# Patient Record
Sex: FEMALE | Race: WHITE | NOT HISPANIC OR LATINO
[De-identification: names, ages, dates, MRNs, and addresses within clinical notes are randomized per-mention and may not be internally consistent; named-entity substitution may affect disease eponyms.]

---

## 2017-01-17 ENCOUNTER — FORM ENCOUNTER (OUTPATIENT)
Age: 52
End: 2017-01-17

## 2017-01-18 ENCOUNTER — APPOINTMENT (OUTPATIENT)
Dept: MAMMOGRAPHY | Facility: IMAGING CENTER | Age: 52
End: 2017-01-18

## 2017-01-18 ENCOUNTER — APPOINTMENT (OUTPATIENT)
Dept: ULTRASOUND IMAGING | Facility: IMAGING CENTER | Age: 52
End: 2017-01-18

## 2017-01-18 ENCOUNTER — OUTPATIENT (OUTPATIENT)
Dept: OUTPATIENT SERVICES | Facility: HOSPITAL | Age: 52
LOS: 1 days | End: 2017-01-18
Payer: COMMERCIAL

## 2017-01-18 DIAGNOSIS — R92.2 INCONCLUSIVE MAMMOGRAM: ICD-10-CM

## 2017-01-18 DIAGNOSIS — Z12.31 ENCOUNTER FOR SCREENING MAMMOGRAM FOR MALIGNANT NEOPLASM OF BREAST: ICD-10-CM

## 2017-01-18 PROCEDURE — G0279: CPT

## 2017-01-18 PROCEDURE — 77067 SCR MAMMO BI INCL CAD: CPT

## 2017-01-18 PROCEDURE — 76641 ULTRASOUND BREAST COMPLETE: CPT

## 2017-01-18 PROCEDURE — 77065 DX MAMMO INCL CAD UNI: CPT

## 2017-02-05 ENCOUNTER — RESULT REVIEW (OUTPATIENT)
Age: 52
End: 2017-02-05

## 2017-11-30 ENCOUNTER — APPOINTMENT (OUTPATIENT)
Dept: SURGICAL ONCOLOGY | Facility: CLINIC | Age: 52
End: 2017-11-30
Payer: COMMERCIAL

## 2017-11-30 VITALS
WEIGHT: 108 LBS | RESPIRATION RATE: 12 BRPM | HEART RATE: 75 BPM | DIASTOLIC BLOOD PRESSURE: 73 MMHG | BODY MASS INDEX: 18.44 KG/M2 | HEIGHT: 64 IN | SYSTOLIC BLOOD PRESSURE: 111 MMHG

## 2017-11-30 PROCEDURE — 99213 OFFICE O/P EST LOW 20 MIN: CPT

## 2018-03-06 ENCOUNTER — MESSAGE (OUTPATIENT)
Age: 53
End: 2018-03-06

## 2018-03-26 ENCOUNTER — APPOINTMENT (OUTPATIENT)
Dept: SURGICAL ONCOLOGY | Facility: CLINIC | Age: 53
End: 2018-03-26
Payer: COMMERCIAL

## 2018-03-26 VITALS
SYSTOLIC BLOOD PRESSURE: 111 MMHG | DIASTOLIC BLOOD PRESSURE: 76 MMHG | HEIGHT: 64 IN | OXYGEN SATURATION: 99 % | HEART RATE: 82 BPM | WEIGHT: 108 LBS | BODY MASS INDEX: 18.44 KG/M2

## 2018-03-26 PROCEDURE — 99214 OFFICE O/P EST MOD 30 MIN: CPT

## 2018-04-02 ENCOUNTER — APPOINTMENT (OUTPATIENT)
Dept: SURGICAL ONCOLOGY | Facility: CLINIC | Age: 53
End: 2018-04-02

## 2018-08-27 ENCOUNTER — TRANSCRIPTION ENCOUNTER (OUTPATIENT)
Age: 53
End: 2018-08-27

## 2018-12-03 ENCOUNTER — APPOINTMENT (OUTPATIENT)
Dept: SURGICAL ONCOLOGY | Facility: CLINIC | Age: 53
End: 2018-12-03
Payer: COMMERCIAL

## 2018-12-03 VITALS
HEIGHT: 64 IN | DIASTOLIC BLOOD PRESSURE: 71 MMHG | HEART RATE: 75 BPM | SYSTOLIC BLOOD PRESSURE: 115 MMHG | WEIGHT: 108 LBS | RESPIRATION RATE: 16 BRPM | OXYGEN SATURATION: 98 % | BODY MASS INDEX: 18.44 KG/M2

## 2018-12-03 PROCEDURE — 99213 OFFICE O/P EST LOW 20 MIN: CPT

## 2019-02-28 ENCOUNTER — MESSAGE (OUTPATIENT)
Age: 54
End: 2019-02-28

## 2019-03-18 ENCOUNTER — MESSAGE (OUTPATIENT)
Age: 54
End: 2019-03-18

## 2019-05-07 ENCOUNTER — APPOINTMENT (OUTPATIENT)
Dept: ULTRASOUND IMAGING | Facility: IMAGING CENTER | Age: 54
End: 2019-05-07

## 2019-05-07 ENCOUNTER — APPOINTMENT (OUTPATIENT)
Dept: MAMMOGRAPHY | Facility: IMAGING CENTER | Age: 54
End: 2019-05-07

## 2019-09-09 ENCOUNTER — MOBILE ON CALL (OUTPATIENT)
Age: 54
End: 2019-09-09

## 2019-12-09 ENCOUNTER — APPOINTMENT (OUTPATIENT)
Dept: SURGICAL ONCOLOGY | Facility: CLINIC | Age: 54
End: 2019-12-09
Payer: COMMERCIAL

## 2019-12-09 VITALS
BODY MASS INDEX: 18.44 KG/M2 | OXYGEN SATURATION: 98 % | RESPIRATION RATE: 16 BRPM | HEART RATE: 79 BPM | DIASTOLIC BLOOD PRESSURE: 75 MMHG | HEIGHT: 64 IN | WEIGHT: 108 LBS | SYSTOLIC BLOOD PRESSURE: 114 MMHG

## 2019-12-09 PROCEDURE — 99213 OFFICE O/P EST LOW 20 MIN: CPT

## 2019-12-26 ENCOUNTER — APPOINTMENT (OUTPATIENT)
Dept: OBGYN | Facility: CLINIC | Age: 54
End: 2019-12-26
Payer: COMMERCIAL

## 2019-12-26 PROCEDURE — 36415 COLL VENOUS BLD VENIPUNCTURE: CPT

## 2019-12-26 PROCEDURE — 99243 OFF/OP CNSLTJ NEW/EST LOW 30: CPT

## 2020-01-08 ENCOUNTER — APPOINTMENT (OUTPATIENT)
Dept: GYNECOLOGIC ONCOLOGY | Facility: CLINIC | Age: 55
End: 2020-01-08

## 2020-01-28 ENCOUNTER — APPOINTMENT (OUTPATIENT)
Dept: GYNECOLOGIC ONCOLOGY | Facility: CLINIC | Age: 55
End: 2020-01-28

## 2020-06-01 NOTE — ASSESSMENT
[FreeTextEntry1] : Clinically doing well.\par \par She has her breast imaging at [facility].\par September 2019 bilateral mammogram: BI-RADS-0.\par Bilateral breast ultrasounds recommended.\par September 2019: Bilateral breast ultrasound BI-RADS 3.\par 6 month followup bilateral ultrasounds recommended for March 2020, I had mailed her a prescription September 26, 2019.\par \par If asymptomatic, with normal imaging, we will see her in another year, sooner if needed\par \par \par 05-22-20:\par Bilateral breast sonogram in [facility]: BI-RADS 3.\par Prescription for annual breast imaging September 2020 with meals 06/01/20.

## 2020-06-01 NOTE — PHYSICAL EXAM
[Normal] : supple, no neck mass and thyroid not enlarged [Normal Neck Lymph Nodes] : normal neck lymph nodes  [Normal Supraclavicular Lymph Nodes] : normal supraclavicular lymph nodes [Normal Axillary Lymph Nodes] : normal axillary lymph nodes [Normal] : normal appearance, no rash, nodules, vesicles, ulcers, erythema [de-identified] : Groins not examined

## 2020-12-14 ENCOUNTER — APPOINTMENT (OUTPATIENT)
Dept: SURGICAL ONCOLOGY | Facility: CLINIC | Age: 55
End: 2020-12-14
Payer: COMMERCIAL

## 2020-12-14 VITALS
HEIGHT: 64 IN | WEIGHT: 109 LBS | HEART RATE: 73 BPM | SYSTOLIC BLOOD PRESSURE: 111 MMHG | DIASTOLIC BLOOD PRESSURE: 66 MMHG | RESPIRATION RATE: 16 BRPM | OXYGEN SATURATION: 98 % | BODY MASS INDEX: 18.61 KG/M2

## 2020-12-14 PROCEDURE — 99214 OFFICE O/P EST MOD 30 MIN: CPT

## 2020-12-14 PROCEDURE — 99072 ADDL SUPL MATRL&STAF TM PHE: CPT

## 2020-12-15 NOTE — HISTORY OF PRESENT ILLNESS
[de-identified] : 55 year-old lady who we have followed for periodic breast examinations since March 2000.\par \par +FH:\par A paternal cousin had breast cancer.\par No relatives with ovarian cancer\par \par Not Ashkenazi.\par \par She had a left breast aspiration at [facility] in March 2016.\par \par Menarche was at age 14\par A single pregnancy was delivered at age 32.\par \par \par Her breast cancer risk score is 26.7 (calculated March 26, 2018) \par She is RELUCTANT to have a baseline breast MRI\par \par \par Her gynecologist is Dr. Yared Bass, Feb 2016 was ok.\par After he retired, she saw Dr. Ashlee Royal\par She now sees Dr. Teofilo BURGESS.\par December 2020 visit was unremarkable, ovarian cyst continues to be followed.\par \par \par Her internist is Dr. Adelia BROWN\par \par She does not have a pacemaker or defibrillator.\par She takes no anticoagulants.\par \par Her only current medication is propranolol, for MVP.\par \par Cardiology: Dr. Joel GOLDBERG\par \par \par c-scope: March 2019: Dr. Luis M segovia x7 years

## 2020-12-15 NOTE — REASON FOR VISIT
[Follow-Up Visit] : a follow-up visit for [Other: _____] : [unfilled] [FreeTextEntry2] : Annual follow-up for increased risk of breast cancer

## 2020-12-15 NOTE — ASSESSMENT
[FreeTextEntry1] : Clinically doing well.\par \par October 2020:\par Bilateral mammogram and sonogram [facility]: BI-RADS 2.\par Prescription provided for October 2021.  \par \par Despite a breast cancer risk score of 26.7, she prefers NOT have surveillance breast MRIs.\par \par \par If asymptomatic, with normal imaging, we will see her in another year, sooner if needed

## 2020-12-15 NOTE — PHYSICAL EXAM
[Normal] : supple, no neck mass and thyroid not enlarged [Normal Neck Lymph Nodes] : normal neck lymph nodes  [Normal Supraclavicular Lymph Nodes] : normal supraclavicular lymph nodes [Normal Axillary Lymph Nodes] : normal axillary lymph nodes [Normal] : normal appearance, no rash, nodules, vesicles, ulcers, erythema [de-identified] : Groins not examined

## 2021-02-19 ENCOUNTER — APPOINTMENT (OUTPATIENT)
Dept: SURGICAL ONCOLOGY | Facility: CLINIC | Age: 56
End: 2021-02-19
Payer: COMMERCIAL

## 2021-02-19 VITALS
WEIGHT: 108 LBS | BODY MASS INDEX: 18.44 KG/M2 | HEIGHT: 64 IN | DIASTOLIC BLOOD PRESSURE: 78 MMHG | SYSTOLIC BLOOD PRESSURE: 118 MMHG | HEART RATE: 86 BPM | OXYGEN SATURATION: 98 %

## 2021-02-19 PROCEDURE — 99215 OFFICE O/P EST HI 40 MIN: CPT

## 2021-02-19 PROCEDURE — 99072 ADDL SUPL MATRL&STAF TM PHE: CPT

## 2021-02-19 NOTE — PHYSICAL EXAM
[Normal] : supple, no neck mass and thyroid not enlarged [Normal Neck Lymph Nodes] : normal neck lymph nodes  [Normal Supraclavicular Lymph Nodes] : normal supraclavicular lymph nodes [Normal Axillary Lymph Nodes] : normal axillary lymph nodes [Normal] : normal appearance, no rash, nodules, vesicles, ulcers, erythema [de-identified] : Groins not examined

## 2021-02-19 NOTE — ASSESSMENT
[FreeTextEntry1] : October 2020:\par Bilateral mammogram and sonogram at [facility]: BI-RADS 2.\par \par Clinically, the tenderness appears to be musculoskeletal in nature, rather than association with the breast parenchyma.\par \par I suggested the following imaging and provided prescriptions for:\par Targeted left breast ultrasound (LIQ).\par Chest x-ray.\par Left rib x-rays.\par \par We will speak after the results.\par \par Even if her symptoms do not progress, and her imaging is normal, I have asked to see her in 6 to 8 weeks, sooner if needed.

## 2021-02-19 NOTE — HISTORY OF PRESENT ILLNESS
[de-identified] : 55 year-old lady who we have followed for periodic breast examinations since March 2000.\par \par Her most recent visit was December 2020.\par She was asymptomatic, with a normal examination.\par Her breast imaging was also current and up-to-date.\par \par She presents today with a 3 to 4-week history of tenderness in the lower inner quadrant of her left breast.\par She still has her menstrual periods, and there may be a temporal relation to her cycle.\par She also swims a fair amount, and the onset may be related to this activity also.\par \par No other signs or symptoms related to either breast.\par \par Caffeine intake: 2 beverages daily.\par Non-smoker.\par No exogenous hormones.\par \par She had a left breast aspiration at [facility] in March 2016.\par No other procedures related to either breast.\par \par +FH:\par A paternal cousin had breast cancer.\par No relatives with ovarian cancer\par \par Not Ashkenazi.\par \par Other relatives with a history of malignancy:\par Father had nonmelanoma skin cancer.\par \par \par Menarche was at age 14\par A single pregnancy was delivered at age 32.\par \par \par Her breast cancer risk score is 26.7 (calculated March 26, 2018) \par She is RELUCTANT to have a baseline breast MRI\par \par \par Her gynecologist is Dr. Yared Bass, Feb 2016 was ok.\par After he retired, she saw Dr. Ashlee Royal\par She now sees Dr. Teofilo BURGESS.\par December 2020 visit was unremarkable, ovarian cyst continues to be followed.\par \par \par Her internist is Dr. Adelia BROWN\par \par She does not have a pacemaker or defibrillator.\par She takes no anticoagulants.\par \par Her only current medication is propranolol, for MVP.\par \par Cardiology: Dr. Joel GOLDBERG\par \par \par c-scope: March 2019: Dr. Luis M segovia x7 years

## 2021-02-19 NOTE — REVIEW OF SYSTEMS
[Negative] : Integumentary [FreeTextEntry5] : Mitral valve prolapse [de-identified] : Ovarian cysts [FreeTextEntry1] : Increased risk of breast cancer

## 2021-02-19 NOTE — REASON FOR VISIT
[Other: _____] : [unfilled] [FreeTextEntry2] : Evaluation and management of new pain in the lower inner quadrant of her left breast

## 2021-05-17 ENCOUNTER — APPOINTMENT (OUTPATIENT)
Dept: SURGICAL ONCOLOGY | Facility: CLINIC | Age: 56
End: 2021-05-17
Payer: COMMERCIAL

## 2021-05-17 NOTE — HISTORY OF PRESENT ILLNESS
[de-identified] : 55-year-old lady returning for evaluation of LEFT BEAST pain (LIQ).\par \par Presented 2/19/2021, with a 3-4-week history of tenderness in her left breast (LIQ).\par Her annual breast exam disease November 2020) had been normal.\par Onset may have been related to a recent increase in swimming.\par Did not have a cyclical component.\par No other signs or symptoms related to either breast.\par \par My PE:\par Left chest tenderness was associated with the lower rib cage, rather than the breast parenchyma.\par No other findings noted.\par \par Breast imaging 4 months prior (October 2020) had been normal (BI-RADS 2 closed disease.\par \par Subsequent imaging:\par 4/23/2021:\par Left chest/breast ultrasound at [facility]: BI-RADS 2.\par No abnormalities identified.\par Accompanying chest x-ray was normal.\par \par She returns for scheduled follow-up today.\par \par \par We have followed for periodic breast examinations since March 2000.\par \par \par Potential factors associated with mastalgia:\par Caffeine intake: 2 beverages daily.\par Non-smoker.\par No exogenous hormones.\par \par \par + Prior personal history:\par She had a left breast aspiration at [facility] in March 2016.\par No other procedures related to either breast.\par \par \par +FH:\par A paternal cousin had breast cancer.\par \par No relatives with ovarian cancer\par \par Not Ashkenazi.\par \par \par Other relatives with a history of malignancy:\par Father had nonmelanoma skin cancer.\par \par \par Menarche was at age 14\par A single pregnancy was delivered at age 32.\par \par \par Her breast cancer risk score is 26.7 (calculated March 26, 2018) \par She is RELUCTANT to have a baseline breast MRI\par \par \par Her gynecologist is Dr. Yared Bass, Feb 2016 was ok.\par After he retired, she saw Dr. Ashlee Royal\par She now sees Dr. Teofilo BURGESS.\par December 2020 visit was unremarkable, ovarian cyst continues to be followed.\par \par \par Her internist is Dr. Adelia BROWN\par \par She does not have a pacemaker or defibrillator.\par She takes no anticoagulants.\par \par Her only current medication is propranolol, for MVP.\par \par Cardiology: Dr. Joel GOLDBERG\par \par \par c-scope: March 2019: Dr. Luis M segovia x7 years

## 2021-05-17 NOTE — PHYSICAL EXAM
[Normal] : supple, no neck mass and thyroid not enlarged [Normal Neck Lymph Nodes] : normal neck lymph nodes  [Normal Supraclavicular Lymph Nodes] : normal supraclavicular lymph nodes [Normal Axillary Lymph Nodes] : normal axillary lymph nodes [Normal] : normal appearance, no rash, nodules, vesicles, ulcers, erythema [de-identified] : Groins not examined

## 2021-05-17 NOTE — ASSESSMENT
[FreeTextEntry1] : 5/17/2021: She did not keep her appointment for follow-up of left mastalgia\par \par Yearly mammogram and sonogram Due October 2021 at [facility] ([facility]).\par \par \par Despite her elevated breast cancer risk score, she is reluctant to have baseline breast MRI.\par \par

## 2021-05-24 ENCOUNTER — APPOINTMENT (OUTPATIENT)
Dept: SURGICAL ONCOLOGY | Facility: CLINIC | Age: 56
End: 2021-05-24
Payer: COMMERCIAL

## 2021-05-24 VITALS
HEIGHT: 64 IN | RESPIRATION RATE: 16 BRPM | HEART RATE: 73 BPM | WEIGHT: 108 LBS | OXYGEN SATURATION: 99 % | BODY MASS INDEX: 18.44 KG/M2 | SYSTOLIC BLOOD PRESSURE: 114 MMHG | DIASTOLIC BLOOD PRESSURE: 76 MMHG

## 2021-05-24 DIAGNOSIS — N60.99 UNSPECIFIED BENIGN MAMMARY DYSPLASIA OF UNSPECIFIED BREAST: ICD-10-CM

## 2021-05-24 PROCEDURE — 99072 ADDL SUPL MATRL&STAF TM PHE: CPT

## 2021-05-24 PROCEDURE — 99214 OFFICE O/P EST MOD 30 MIN: CPT

## 2021-07-07 ENCOUNTER — APPOINTMENT (OUTPATIENT)
Dept: ORTHOPEDIC SURGERY | Facility: CLINIC | Age: 56
End: 2021-07-07
Payer: COMMERCIAL

## 2021-07-07 VITALS
WEIGHT: 108 LBS | DIASTOLIC BLOOD PRESSURE: 79 MMHG | BODY MASS INDEX: 18.44 KG/M2 | HEIGHT: 64 IN | SYSTOLIC BLOOD PRESSURE: 113 MMHG | HEART RATE: 84 BPM

## 2021-07-07 DIAGNOSIS — G56.80 OTHER SPECIFIED MONONEUROPATHIES OF UNSPECIFIED UPPER LIMB: ICD-10-CM

## 2021-07-07 DIAGNOSIS — M25.512 PAIN IN LEFT SHOULDER: ICD-10-CM

## 2021-07-07 PROCEDURE — 99072 ADDL SUPL MATRL&STAF TM PHE: CPT

## 2021-07-07 PROCEDURE — 99202 OFFICE O/P NEW SF 15 MIN: CPT

## 2021-08-18 ENCOUNTER — TRANSCRIPTION ENCOUNTER (OUTPATIENT)
Age: 56
End: 2021-08-18

## 2021-10-04 NOTE — PHYSICAL EXAM
[Normal] : supple, no neck mass and thyroid not enlarged [Normal Neck Lymph Nodes] : normal neck lymph nodes  [Normal Supraclavicular Lymph Nodes] : normal supraclavicular lymph nodes [Normal Axillary Lymph Nodes] : normal axillary lymph nodes [Normal] : normal appearance, no rash, nodules, vesicles, ulcers, erythema [de-identified] : Groins not examined

## 2021-10-04 NOTE — REASON FOR VISIT
[Follow-Up Visit] : a follow-up visit for [Other: _____] : [unfilled] [FreeTextEntry2] : Left breast pain and tenderness, along with increased risk of breast cancer

## 2021-10-04 NOTE — HISTORY OF PRESENT ILLNESS
[de-identified] : 55 year-old lady returning for evaluation of LEFT BEAST pain (LIQ).\par \par Presented 2/19/2021, with a 3-4-week history of tenderness in her left breast (LIQ).\par Her annual breast exam disease November 2020) had been normal.\par Onset may have been related to a recent increase in swimming.\par Did not have a cyclical component.\par No other signs or symptoms related to either breast.\par \par My PE:\par Left chest tenderness was associated with the lower rib cage, rather than the breast parenchyma.\par No other findings noted.\par \par Breast imaging 4 months prior (October 2020) had been normal (BI-RADS 2 closed disease.\par \par Subsequent imaging:\par 4/23/2021:\par Left chest/breast ultrasound at [facility]: BI-RADS 2.\par No abnormalities identified.\par Accompanying chest x-ray was normal.\par \par She returns for scheduled follow-up today.\par Today she is asymptomatic.\par \par \par We have followed for periodic breast examinations since March 2000.\par \par \par Potential factors associated with mastalgia:\par Caffeine intake: 2 beverages daily.\par Non-smoker.\par No exogenous hormones.\par \par \par + Prior personal history:\par She had a left breast aspiration at [facility] in March 2016.\par No other procedures related to either breast.\par \par \par +FH:\par A paternal cousin had breast cancer.\par \par No relatives with ovarian cancer\par \par Not Ashkenazi.\par \par \par Other relatives with a history of malignancy:\par Father had nonmelanoma skin cancer.\par \par \par Menarche was at age 14\par A single pregnancy was delivered at age 32.\par \par \par Her breast cancer risk score is 26.7 (calculated March 26, 2018) \par She is RELUCTANT to have a baseline breast MRI\par \par \par Her internist is Dr. Adelia BROWN\par \par She does not have a pacemaker or defibrillator.\par She takes no anticoagulants.\par \par Her only current medication is propranolol, for MVP.\par \par Cardiology: Dr. Joel GOLDBERG\par \par \par Her gynecologist is Dr. Yared Bass, Feb 2016 was ok.\par After he retired, she saw Dr. Ashlee Royal\par She now sees Dr. Teofilo BURGESS.\par December 2020 visit was unremarkable, ovarian cyst continues to be followed.\par \par \par c-scope: March 2019: Dr. Luis M Ramirez okay x7 years

## 2021-10-04 NOTE — ASSESSMENT
[FreeTextEntry1] : Clinically doing well.\par Her left breast pain and tenderness have resolved.\par No new signs or symptoms related to either breast.\par \par Yearly mammogram and sonogram Due October 2021 at [facility] ([facility]par \par \par Despite her elevated breast cancer risk score (26.7), she is reluctant to have baseline breast MRI.\par \par If asymptomatic, with normal imaging, we should see her in another year, sooner if needed\par \par \par \par 9/24/2021:\par I returned her call but had to leave a voicemail.\par \par \par 10/4/2021:\par I returned her call.\par I had to leave another voicemail.\par \par

## 2021-12-17 ENCOUNTER — APPOINTMENT (OUTPATIENT)
Dept: SURGICAL ONCOLOGY | Facility: CLINIC | Age: 56
End: 2021-12-17

## 2022-05-01 RX ORDER — ALBUTEROL SULFATE 90 UG/1
108 (90 BASE) INHALANT RESPIRATORY (INHALATION)
Qty: 8 | Refills: 0 | Status: ACTIVE | COMMUNITY
Start: 2022-01-17

## 2022-05-01 RX ORDER — ZOLPIDEM TARTRATE 5 MG/1
5 TABLET ORAL
Qty: 10 | Refills: 0 | Status: ACTIVE | COMMUNITY
Start: 2022-01-27

## 2022-05-01 RX ORDER — FLUTICASONE PROPIONATE 110 UG/1
110 AEROSOL, METERED RESPIRATORY (INHALATION)
Qty: 12 | Refills: 0 | Status: ACTIVE | COMMUNITY
Start: 2022-01-22

## 2022-05-02 ENCOUNTER — APPOINTMENT (OUTPATIENT)
Dept: SURGICAL ONCOLOGY | Facility: CLINIC | Age: 57
End: 2022-05-02
Payer: COMMERCIAL

## 2022-05-02 VITALS
RESPIRATION RATE: 15 BRPM | SYSTOLIC BLOOD PRESSURE: 113 MMHG | WEIGHT: 108 LBS | BODY MASS INDEX: 18.44 KG/M2 | HEART RATE: 75 BPM | OXYGEN SATURATION: 99 % | TEMPERATURE: 97.8 F | HEIGHT: 64 IN | DIASTOLIC BLOOD PRESSURE: 76 MMHG

## 2022-05-02 PROCEDURE — 99214 OFFICE O/P EST MOD 30 MIN: CPT

## 2022-05-02 NOTE — ASSESSMENT
[FreeTextEntry1] : Clinically doing well.\par Her left breast pain and tenderness have resolved.\par No new signs or symptoms related to either breast.\par \par November 2021:\par Bilateral mammogram and sonogram at [facility] in [place]: BI-RADS 2.\par Prescription provided for November 2022.\par \par \par Despite her elevated breast cancer risk score (26.7), she is reluctant to have baseline breast MRI.\par \par If asymptomatic, with normal imaging, we should see her in another year, sooner if needed\par \par

## 2022-05-02 NOTE — PHYSICAL EXAM
[Normal] : supple, no neck mass and thyroid not enlarged [Normal Neck Lymph Nodes] : normal neck lymph nodes  [Normal Supraclavicular Lymph Nodes] : normal supraclavicular lymph nodes [Normal Axillary Lymph Nodes] : normal axillary lymph nodes [Normal] : normal appearance, no rash, nodules, vesicles, ulcers, erythema [de-identified] : Groins not examined

## 2022-05-02 NOTE — REASON FOR VISIT
[Follow-Up Visit] : a follow-up visit for [Other: _____] : [unfilled] [FreeTextEntry2] : Annual breast exam, breast cancer risk score = 26.7 (patient declines breast MRI)

## 2022-05-02 NOTE — HISTORY OF PRESENT ILLNESS
[de-identified] : 56-year-old lady.\par \par \par We have followed for periodic breast examinations since March 2000.\par \par \par CC:\par No signs or symptoms related to either breast today.\par \par \par \par February 2021:\par 3-4-week history of left mastalgia.\par My exam was unremarkable.\par Imaging was normal.\par Symptoms were self-limited and resolved.\par \par \par Potential factors associated with mastalgia:\par Caffeine intake: 2 beverages daily.\par Non-smoker.\par No exogenous hormones.\par \par \par + Prior personal history:\par She had a left breast aspiration at [facility] in March 2016.\par No other procedures related to either breast.\par \par \par +FH:\par A paternal cousin had breast cancer.\par \par No relatives with ovarian cancer\par \par Not Ashkenazi.\par \par \par Other relatives with a history of malignancy:\par Father had nonmelanoma skin cancer.\par \par \par Menarche was at age 14\par A single pregnancy was delivered at age 32.\par \par \par Her breast cancer risk score is 26.7 (calculated March 26, 2018) \par She is RELUCTANT to have a baseline breast MRI\par \par \par Her internist is Dr. Adelia BROWN, BUT she retired in April 2022.\par Our patient saw her in February 2022.\par For next year, she will be changing to one of the associates, but not sure of which 1.\par \par She does not have a pacemaker or defibrillator.\par She takes no anticoagulants.\par \par Her only current medication is propranolol, for MVP.\par \par Cardiology: Dr. Joel GOLDBERG\par \par \par Her gynecologist is Dr. Yared Bass, Feb 2016 was ok.\par After he retired, she saw Dr. Ashlee Royal\par She now sees Dr. Teofilo BURGESS.\par December 2020 visit was unremarkable, ovarian cyst continues to be followed.\par \par \par c-scope: March 2019: Dr. Luis M Ramirez okay x7 years

## 2022-05-02 NOTE — REVIEW OF SYSTEMS
[Negative] : Integumentary [FreeTextEntry5] : KELSIE [de-identified] : Prior history of mastalgia [FreeTextEntry1] : Increased risk of breast cancer

## 2022-08-22 NOTE — HISTORY OF PRESENT ILLNESS
[de-identified] : 54 year-old lady who we have followed for periodic breast examinations since March 2000.\par \par +FH:\par A paternal cousin had breast cancer.\par No relatives with ovarian cancer\par \par Not Ashkenazi.\par \par She had a left breast aspiration at [facility] in March 2016.\par \par Menarche was at age 14\par A single pregnancy was delivered at age 32.\par \par \par Her breast cancer risk score is 26.7 (calculated March 26, 2018) \par She is RELUCTANT to have a baseline breast MRI\par \par Her gynecologist is Dr. Yared Bass, Feb 2016 was ok.\par She saw Dr. Ashlee LUND in August 2019.\par An abnormal Pap smear prompted a pelvic ultrasound which identified an incidental left ovarian cyst.\par Also, although her cervix appeared normal, there was a concern regarding the endometrial lining.\par She has a surgical opinion scheduled with Dr. Teofilo Ventura\par \par Her internist is Dr. Adelia BROWN\par \par She does not have a pacemaker or defibrillator.\par She takes no anticoagulants.\par \par Her only current medication is propranolol, for MVP\par \par \par c-scope, not yet Peng Advancement Flap Text: The defect edges were debeveled with a #15 scalpel blade.  Given the location of the defect, shape of the defect and the proximity to free margins a Peng advancement flap was deemed most appropriate.  Using a sterile surgical marker, an appropriate advancement flap was drawn incorporating the defect and placing the expected incisions within the relaxed skin tension lines where possible. The area thus outlined was incised deep to adipose tissue with a #15 scalpel blade.  The skin margins were undermined to an appropriate distance in all directions utilizing iris scissors.

## 2022-11-02 ENCOUNTER — OFFICE (OUTPATIENT)
Dept: URBAN - METROPOLITAN AREA CLINIC 29 | Facility: CLINIC | Age: 57
Setting detail: OPHTHALMOLOGY
End: 2022-11-02
Payer: COMMERCIAL

## 2022-11-02 DIAGNOSIS — H01.001: ICD-10-CM

## 2022-11-02 DIAGNOSIS — H00.14: ICD-10-CM

## 2022-11-02 DIAGNOSIS — H01.004: ICD-10-CM

## 2022-11-02 PROCEDURE — 11900 INJECT SKIN LESIONS </W 7: CPT | Performed by: OPHTHALMOLOGY

## 2022-11-02 PROCEDURE — 99213 OFFICE O/P EST LOW 20 MIN: CPT | Performed by: OPHTHALMOLOGY

## 2022-11-02 ASSESSMENT — REFRACTION_AUTOREFRACTION
OD_SPHERE: -1.50
OD_CYLINDER: +0.25
OD_AXIS: 025
OS_CYLINDER: +0.25
OS_SPHERE: -3.25
OS_AXIS: 130

## 2022-11-02 ASSESSMENT — REFRACTION_MANIFEST
OS_SPHERE: -3.00
OD_ADD: +2.00
OS_CYLINDER: SPH
OD_SPHERE: -2.00
OD_CYLINDER: SPH
OD_VA1: 20/20-1
OS_VA1: 20/20
OS_ADD: +2.00

## 2022-11-02 ASSESSMENT — REFRACTION_CURRENTRX
OS_CYLINDER: SPH
OS_SPHERE: -2.75
OD_SPHERE: -2.00
OD_CYLINDER: SPH
OD_VPRISM_DIRECTION: SV
OS_CYLINDER: SPH
OS_CYLINDER: SPH
OD_CYLINDER: SPH
OD_SPHERE: +1.00
OD_OVR_VA: 20/
OS_AXIS: 090
OD_OVR_VA: 20/
OD_SPHERE: -2.00
OD_OVR_VA: 20/
OS_OVR_VA: 20/
OS_SPHERE: -3.00
OS_OVR_VA: 20/
OS_VPRISM_DIRECTION: SV
OS_SPHERE: +1.00
OD_CYLINDER: SPH
OS_OVR_VA: 20/
OD_AXIS: 090

## 2022-11-02 ASSESSMENT — SPHEQUIV_DERIVED
OD_SPHEQUIV: -1.375
OS_SPHEQUIV: -3.125

## 2022-11-02 ASSESSMENT — LID EXAM ASSESSMENTS
OS_EDEMA: LUL 1+
OD_BLEPHARITIS: RUL 1+
OS_BLEPHARITIS: LUL 1+

## 2022-11-02 ASSESSMENT — CONFRONTATIONAL VISUAL FIELD TEST (CVF)
OD_FINDINGS: FULL
OS_FINDINGS: FULL

## 2022-11-02 ASSESSMENT — VISUAL ACUITY
OD_BCVA: 20/40
OS_BCVA: 20/20

## 2022-11-02 ASSESSMENT — SUPERFICIAL PUNCTATE KERATITIS (SPK)
OD_SPK: T
OS_SPK: T

## 2023-01-31 ENCOUNTER — OFFICE (OUTPATIENT)
Dept: URBAN - METROPOLITAN AREA CLINIC 29 | Facility: CLINIC | Age: 58
Setting detail: OPHTHALMOLOGY
End: 2023-01-31
Payer: COMMERCIAL

## 2023-01-31 DIAGNOSIS — H00.14: ICD-10-CM

## 2023-01-31 DIAGNOSIS — H01.001: ICD-10-CM

## 2023-01-31 DIAGNOSIS — H01.004: ICD-10-CM

## 2023-01-31 PROCEDURE — 11900 INJECT SKIN LESIONS </W 7: CPT | Performed by: OPHTHALMOLOGY

## 2023-01-31 PROCEDURE — 99213 OFFICE O/P EST LOW 20 MIN: CPT | Performed by: OPHTHALMOLOGY

## 2023-01-31 ASSESSMENT — REFRACTION_CURRENTRX
OS_CYLINDER: SPH
OS_OVR_VA: 20/
OD_VPRISM_DIRECTION: SV
OD_OVR_VA: 20/
OD_SPHERE: +1.00
OD_CYLINDER: SPH
OS_OVR_VA: 20/
OS_OVR_VA: 20/
OD_OVR_VA: 20/
OS_CYLINDER: SPH
OD_SPHERE: -2.00
OD_SPHERE: -2.00
OS_VPRISM_DIRECTION: SV
OD_CYLINDER: SPH
OD_OVR_VA: 20/
OS_SPHERE: -2.75
OS_SPHERE: +1.00
OS_CYLINDER: SPH
OD_AXIS: 090
OS_AXIS: 090
OS_SPHERE: -3.00
OD_CYLINDER: SPH

## 2023-01-31 ASSESSMENT — LID EXAM ASSESSMENTS
OS_EDEMA: LUL 1+
OD_BLEPHARITIS: RUL 1+
OS_BLEPHARITIS: LUL 1+

## 2023-01-31 ASSESSMENT — REFRACTION_MANIFEST
OS_ADD: +2.00
OD_SPHERE: -2.00
OS_VA1: 20/20
OS_SPHERE: -3.00
OD_VA1: 20/20-1
OD_ADD: +2.00
OS_CYLINDER: SPH
OD_CYLINDER: SPH

## 2023-01-31 ASSESSMENT — REFRACTION_AUTOREFRACTION
OS_SPHERE: -3.25
OD_SPHERE: -1.50
OS_AXIS: 130
OD_AXIS: 025
OD_CYLINDER: +0.25
OS_CYLINDER: +0.25

## 2023-01-31 ASSESSMENT — CONFRONTATIONAL VISUAL FIELD TEST (CVF)
OD_FINDINGS: FULL
OS_FINDINGS: FULL

## 2023-01-31 ASSESSMENT — SPHEQUIV_DERIVED
OD_SPHEQUIV: -1.375
OS_SPHEQUIV: -3.125

## 2023-01-31 ASSESSMENT — SUPERFICIAL PUNCTATE KERATITIS (SPK)
OS_SPK: T
OD_SPK: T

## 2023-02-13 ENCOUNTER — OFFICE (OUTPATIENT)
Dept: URBAN - METROPOLITAN AREA CLINIC 29 | Facility: CLINIC | Age: 58
Setting detail: OPHTHALMOLOGY
End: 2023-02-13
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
## 2023-02-13 PROCEDURE — 10004 FNA BX W/O IMG GDN EA ADDL: CPT | Performed by: OPHTHALMOLOGY

## 2023-03-09 ENCOUNTER — OFFICE (OUTPATIENT)
Dept: URBAN - METROPOLITAN AREA CLINIC 29 | Facility: CLINIC | Age: 58
Setting detail: OPHTHALMOLOGY
End: 2023-03-09
Payer: COMMERCIAL

## 2023-03-09 DIAGNOSIS — H01.004: ICD-10-CM

## 2023-03-09 DIAGNOSIS — H01.001: ICD-10-CM

## 2023-03-09 DIAGNOSIS — H00.14: ICD-10-CM

## 2023-03-09 PROCEDURE — 99213 OFFICE O/P EST LOW 20 MIN: CPT | Performed by: OPHTHALMOLOGY

## 2023-03-09 ASSESSMENT — REFRACTION_CURRENTRX
OS_OVR_VA: 20/
OD_SPHERE: +1.00
OS_CYLINDER: SPH
OD_SPHERE: -2.00
OD_CYLINDER: SPH
OS_OVR_VA: 20/
OS_AXIS: 090
OS_SPHERE: +1.00
OS_CYLINDER: SPH
OS_SPHERE: -2.75
OD_SPHERE: -2.00
OD_OVR_VA: 20/
OS_OVR_VA: 20/
OD_CYLINDER: SPH
OD_CYLINDER: SPH
OS_VPRISM_DIRECTION: SV
OD_OVR_VA: 20/
OS_CYLINDER: SPH
OD_AXIS: 090
OD_VPRISM_DIRECTION: SV
OD_OVR_VA: 20/
OS_SPHERE: -3.00

## 2023-03-09 ASSESSMENT — REFRACTION_AUTOREFRACTION
OS_AXIS: 130
OD_AXIS: 025
OS_SPHERE: -3.25
OD_SPHERE: -1.50
OD_CYLINDER: +0.25
OS_CYLINDER: +0.25

## 2023-03-09 ASSESSMENT — VISUAL ACUITY
OD_BCVA: 20/DEFFER
OS_BCVA: 20/DEFFER

## 2023-03-09 ASSESSMENT — REFRACTION_MANIFEST
OD_CYLINDER: SPH
OS_VA1: 20/20
OD_ADD: +2.00
OD_VA1: 20/20-1
OS_CYLINDER: SPH
OD_SPHERE: -2.00
OS_ADD: +2.00
OS_SPHERE: -3.00

## 2023-03-09 ASSESSMENT — LID EXAM ASSESSMENTS
OS_EDEMA: LUL 1+
OS_BLEPHARITIS: LUL 1+
OD_BLEPHARITIS: RUL 1+

## 2023-03-09 ASSESSMENT — SUPERFICIAL PUNCTATE KERATITIS (SPK)
OD_SPK: T
OS_SPK: T

## 2023-03-09 ASSESSMENT — CONFRONTATIONAL VISUAL FIELD TEST (CVF)
OD_FINDINGS: FULL
OS_FINDINGS: FULL

## 2023-03-09 ASSESSMENT — SPHEQUIV_DERIVED
OS_SPHEQUIV: -3.125
OD_SPHEQUIV: -1.375

## 2023-03-15 ENCOUNTER — RX ONLY (RX ONLY)
Age: 58
End: 2023-03-15

## 2023-03-15 ENCOUNTER — OFFICE (OUTPATIENT)
Dept: URBAN - METROPOLITAN AREA CLINIC 29 | Facility: CLINIC | Age: 58
Setting detail: OPHTHALMOLOGY
End: 2023-03-15
Payer: COMMERCIAL

## 2023-03-15 DIAGNOSIS — H00.15: ICD-10-CM

## 2023-03-15 PROCEDURE — 67800 REMOVE EYELID LESION: CPT | Performed by: OPHTHALMOLOGY

## 2023-03-15 ASSESSMENT — REFRACTION_CURRENTRX
OS_SPHERE: -2.75
OS_SPHERE: -3.00
OD_CYLINDER: SPH
OS_AXIS: 090
OD_CYLINDER: SPH
OD_SPHERE: +1.00
OS_CYLINDER: SPH
OD_SPHERE: -2.00
OD_VPRISM_DIRECTION: SV
OS_CYLINDER: SPH
OS_OVR_VA: 20/
OD_CYLINDER: SPH
OS_VPRISM_DIRECTION: SV
OS_OVR_VA: 20/
OD_OVR_VA: 20/
OD_SPHERE: -2.00
OS_OVR_VA: 20/
OS_SPHERE: +1.00
OD_AXIS: 090
OD_OVR_VA: 20/
OD_OVR_VA: 20/
OS_CYLINDER: SPH

## 2023-03-15 ASSESSMENT — REFRACTION_MANIFEST
OD_SPHERE: -2.00
OD_VA1: 20/20-1
OS_VA1: 20/20
OD_CYLINDER: SPH
OS_ADD: +2.00
OD_ADD: +2.00
OS_SPHERE: -3.00
OS_CYLINDER: SPH

## 2023-03-15 ASSESSMENT — LID EXAM ASSESSMENTS
OD_BLEPHARITIS: RUL 1+
OS_BLEPHARITIS: LUL 1+
OS_EDEMA: LUL 1+

## 2023-03-15 ASSESSMENT — SUPERFICIAL PUNCTATE KERATITIS (SPK)
OS_SPK: T
OD_SPK: T

## 2023-03-15 ASSESSMENT — VISUAL ACUITY
OD_BCVA: 20/DEFFER
OS_BCVA: 20/DEFFER

## 2023-03-15 ASSESSMENT — REFRACTION_AUTOREFRACTION
OD_AXIS: 025
OS_SPHERE: -3.25
OD_SPHERE: -1.50
OS_AXIS: 130
OS_CYLINDER: +0.25
OD_CYLINDER: +0.25

## 2023-03-15 ASSESSMENT — SPHEQUIV_DERIVED
OD_SPHEQUIV: -1.375
OS_SPHEQUIV: -3.125

## 2023-05-05 ENCOUNTER — APPOINTMENT (OUTPATIENT)
Dept: SURGICAL ONCOLOGY | Facility: CLINIC | Age: 58
End: 2023-05-05
Payer: COMMERCIAL

## 2023-05-05 VITALS
DIASTOLIC BLOOD PRESSURE: 76 MMHG | BODY MASS INDEX: 18.44 KG/M2 | SYSTOLIC BLOOD PRESSURE: 114 MMHG | RESPIRATION RATE: 16 BRPM | WEIGHT: 108 LBS | HEIGHT: 64 IN | HEART RATE: 83 BPM | OXYGEN SATURATION: 98 %

## 2023-05-05 PROCEDURE — 99213 OFFICE O/P EST LOW 20 MIN: CPT

## 2023-05-08 NOTE — REASON FOR VISIT
[Follow-Up Visit] : a follow-up visit for [Other: _____] : [unfilled] [FreeTextEntry2] : Annual breast examination, breast cancer risk score = 26.7 (she declines breast MRI)

## 2023-05-08 NOTE — HISTORY OF PRESENT ILLNESS
[de-identified] : 57 year-old lady.\par \par \par We have followed for periodic breast examinations since March 2000.\par \par \par CC:\par No signs or symptoms related to either breast today.\par \par \par \par February 2021:\par 3-4-week history of left mastalgia.\par My exam was unremarkable.\par Imaging was normal.\par Symptoms were self-limited and resolved.\par \par \par Potential factors associated with mastalgia:\par Caffeine intake: 2 beverages daily.\par Non-smoker.\par No exogenous hormones.\par \par \par + Prior personal history:\par She had a left breast aspiration at [facility] in March 2016.\par No other procedures related to either breast.\par \par \par +FH:\par A paternal cousin had breast cancer.\par \par No relatives with ovarian cancer\par \par Not Ashkenazi.\par \par \par Other relatives with a history of malignancy:\par Father had nonmelanoma skin cancer.\par \par \par Menarche was at age 14\par A single pregnancy was delivered at age 32.\par \par \par Her breast cancer risk score is 26.7 (calculated March 26, 2018) \par She is RELUCTANT to have a baseline breast MRI\par \par \par PMD: Dr. Jacqueline Kim.\par She used to see Dr. Linda Putnam, who retired in the spring 2022.\par \par She does not have a pacemaker or defibrillator.\par She takes no anticoagulants.\par \par Her only current medication is propranolol, for MVP.\par \par Cardiology: Dr. Joel GOLDBERG\par \par \par GYN: Dr. Teofilo BURGESS.\par September 2022 visit was unremarkable.\par \par Previously she saw Dr. Yared Bass, then Dr. Ashlee Royal\par \par \par c-scope: March 2019: Dr. Luis M segovia x7 years

## 2023-05-08 NOTE — REVIEW OF SYSTEMS
[Negative] : Endocrine [FreeTextEntry5] : Mitral valve prolapse [FreeTextEntry1] : Increased risk of breast cancer

## 2023-05-08 NOTE — ASSESSMENT
[FreeTextEntry1] : Clinically doing well.\par Her left breast pain and tenderness have resolved.\par No new signs or symptoms related to either breast.\par \par November 2021:\par Bilateral mammogram and sonogram at [facility] in [place]: BI-RADS 2.\par Prescription provided for November 2022 at her last office visit\par \par (12/13/2022:\par Bilateral mammogram and sonogram at [facility]: BI-RADS 2)...\par \par \par Despite her elevated breast cancer risk score (26.7), she is reluctant to have baseline breast MRI.\par \par If asymptomatic, with normal imaging, we should see her in another year, sooner if needed\par \par

## 2023-05-08 NOTE — PHYSICAL EXAM
[Normal] : supple, no neck mass and thyroid not enlarged [Normal Neck Lymph Nodes] : normal neck lymph nodes  [Normal Supraclavicular Lymph Nodes] : normal supraclavicular lymph nodes [Normal Axillary Lymph Nodes] : normal axillary lymph nodes [Normal] : normal appearance, no rash, nodules, vesicles, ulcers, erythema [de-identified] : Groins not examined

## 2023-06-07 ENCOUNTER — OFFICE (OUTPATIENT)
Dept: URBAN - METROPOLITAN AREA CLINIC 29 | Facility: CLINIC | Age: 58
Setting detail: OPHTHALMOLOGY
End: 2023-06-07
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
## 2023-06-07 PROCEDURE — 10004 FNA BX W/O IMG GDN EA ADDL: CPT | Performed by: OPHTHALMOLOGY

## 2023-06-28 ENCOUNTER — NON-APPOINTMENT (OUTPATIENT)
Age: 58
End: 2023-06-28

## 2023-07-11 ENCOUNTER — NON-APPOINTMENT (OUTPATIENT)
Age: 58
End: 2023-07-11

## 2023-08-09 ENCOUNTER — OFFICE (OUTPATIENT)
Dept: URBAN - METROPOLITAN AREA CLINIC 29 | Facility: CLINIC | Age: 58
Setting detail: OPHTHALMOLOGY
End: 2023-08-09
Payer: COMMERCIAL

## 2023-08-09 DIAGNOSIS — H01.001: ICD-10-CM

## 2023-08-09 DIAGNOSIS — H16.223: ICD-10-CM

## 2023-08-09 DIAGNOSIS — H01.004: ICD-10-CM

## 2023-08-09 DIAGNOSIS — H00.15: ICD-10-CM

## 2023-08-09 PROCEDURE — 92012 INTRM OPH EXAM EST PATIENT: CPT | Performed by: OPHTHALMOLOGY

## 2023-08-09 ASSESSMENT — SPHEQUIV_DERIVED
OD_SPHEQUIV: -1.375
OS_SPHEQUIV: -3.125

## 2023-08-09 ASSESSMENT — REFRACTION_MANIFEST
OD_ADD: +2.00
OS_ADD: +2.00
OD_SPHERE: -2.00
OD_VA1: 20/20-1
OS_CYLINDER: SPH
OS_SPHERE: -3.00
OS_VA1: 20/20
OD_CYLINDER: SPH

## 2023-08-09 ASSESSMENT — REFRACTION_CURRENTRX
OS_SPHERE: +1.00
OD_SPHERE: -2.00
OS_AXIS: 090
OS_OVR_VA: 20/
OD_VPRISM_DIRECTION: SV
OS_OVR_VA: 20/
OS_AXIS: 090
OS_VPRISM_DIRECTION: SV
OD_AXIS: 090
OD_SPHERE: +1.00
OD_CYLINDER: SPH
OS_CYLINDER: SPH
OS_CYLINDER: SPH
OS_OVR_VA: 20/
OS_SPHERE: -3.00
OD_AXIS: 090
OD_CYLINDER: SPH
OD_CYLINDER: SPH
OD_OVR_VA: 20/
OS_SPHERE: -2.75
OS_CYLINDER: SPH
OD_OVR_VA: 20/
OD_SPHERE: -2.00
OD_OVR_VA: 20/

## 2023-08-09 ASSESSMENT — VISUAL ACUITY
OS_BCVA: 20/20-2
OD_BCVA: 20/20

## 2023-08-09 ASSESSMENT — REFRACTION_AUTOREFRACTION
OD_CYLINDER: +0.25
OS_AXIS: 130
OS_SPHERE: -3.25
OS_CYLINDER: +0.25
OD_AXIS: 025
OD_SPHERE: -1.50

## 2023-08-09 ASSESSMENT — SUPERFICIAL PUNCTATE KERATITIS (SPK)
OS_SPK: T
OD_SPK: T

## 2023-08-09 ASSESSMENT — LID EXAM ASSESSMENTS
OD_BLEPHARITIS: RUL 1+
OS_EDEMA: LUL 1+
OS_BLEPHARITIS: LUL 1+

## 2023-08-09 ASSESSMENT — CONFRONTATIONAL VISUAL FIELD TEST (CVF)
OD_FINDINGS: FULL
OS_FINDINGS: FULL

## 2023-09-05 ENCOUNTER — OFFICE (OUTPATIENT)
Dept: URBAN - METROPOLITAN AREA CLINIC 29 | Facility: CLINIC | Age: 58
Setting detail: OPHTHALMOLOGY
End: 2023-09-05
Payer: COMMERCIAL

## 2023-09-05 DIAGNOSIS — H01.001: ICD-10-CM

## 2023-09-05 DIAGNOSIS — H00.15: ICD-10-CM

## 2023-09-05 DIAGNOSIS — H01.004: ICD-10-CM

## 2023-09-05 PROCEDURE — 92014 COMPRE OPH EXAM EST PT 1/>: CPT | Performed by: OPHTHALMOLOGY

## 2023-09-05 ASSESSMENT — VISUAL ACUITY
OS_BCVA: 20/20
OD_BCVA: 20/20-2

## 2023-09-05 ASSESSMENT — REFRACTION_MANIFEST
OD_CYLINDER: SPH
OS_CYLINDER: SPH
OD_VA1: 20/20-1
OD_SPHERE: -2.00
OD_ADD: +2.00
OS_VA1: 20/20
OS_SPHERE: -3.00
OS_ADD: +2.00

## 2023-09-05 ASSESSMENT — REFRACTION_CURRENTRX
OS_OVR_VA: 20/
OS_AXIS: 090
OS_AXIS: 090
OS_CYLINDER: SPH
OD_OVR_VA: 20/
OS_SPHERE: +1.00
OS_SPHERE: -2.75
OD_AXIS: 090
OD_SPHERE: -2.00
OD_OVR_VA: 20/
OS_SPHERE: -3.00
OS_OVR_VA: 20/
OS_CYLINDER: SPH
OD_SPHERE: -2.00
OD_OVR_VA: 20/
OD_AXIS: 090
OS_OVR_VA: 20/
OD_SPHERE: +1.00
OD_CYLINDER: SPH
OS_CYLINDER: SPH
OD_CYLINDER: SPH
OD_CYLINDER: SPH
OD_VPRISM_DIRECTION: SV
OS_VPRISM_DIRECTION: SV

## 2023-09-05 ASSESSMENT — TONOMETRY
OD_IOP_MMHG: 13
OS_IOP_MMHG: 13

## 2023-09-05 ASSESSMENT — LID EXAM ASSESSMENTS
OS_BLEPHARITIS: LUL 1+
OD_BLEPHARITIS: RUL 1+
OS_EDEMA: LUL 1+

## 2023-09-05 ASSESSMENT — REFRACTION_AUTOREFRACTION
OD_CYLINDER: +0.25
OS_CYLINDER: +0.25
OD_SPHERE: -1.75
OS_SPHERE: -2.75
OD_AXIS: 026
OS_AXIS: 135

## 2023-09-05 ASSESSMENT — CONFRONTATIONAL VISUAL FIELD TEST (CVF)
OD_FINDINGS: FULL
OS_FINDINGS: FULL

## 2023-09-05 ASSESSMENT — SPHEQUIV_DERIVED
OS_SPHEQUIV: -2.625
OD_SPHEQUIV: -1.625

## 2023-09-05 ASSESSMENT — SUPERFICIAL PUNCTATE KERATITIS (SPK)
OS_SPK: T
OD_SPK: T

## 2023-09-18 ENCOUNTER — OFFICE (OUTPATIENT)
Dept: URBAN - METROPOLITAN AREA CLINIC 29 | Facility: CLINIC | Age: 58
Setting detail: OPHTHALMOLOGY
End: 2023-09-18
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
## 2023-09-18 PROCEDURE — 10004 FNA BX W/O IMG GDN EA ADDL: CPT | Performed by: OPHTHALMOLOGY

## 2023-10-25 ENCOUNTER — APPOINTMENT (OUTPATIENT)
Dept: CARDIOLOGY | Facility: CLINIC | Age: 58
End: 2023-10-25
Payer: COMMERCIAL

## 2023-10-25 VITALS
DIASTOLIC BLOOD PRESSURE: 64 MMHG | HEIGHT: 64 IN | HEART RATE: 68 BPM | WEIGHT: 108 LBS | SYSTOLIC BLOOD PRESSURE: 108 MMHG | BODY MASS INDEX: 18.44 KG/M2 | OXYGEN SATURATION: 99 %

## 2023-10-25 PROCEDURE — 99204 OFFICE O/P NEW MOD 45 MIN: CPT | Mod: 25

## 2023-10-25 PROCEDURE — 93000 ELECTROCARDIOGRAM COMPLETE: CPT

## 2023-11-07 ENCOUNTER — APPOINTMENT (OUTPATIENT)
Dept: CT IMAGING | Facility: CLINIC | Age: 58
End: 2023-11-07
Payer: SELF-PAY

## 2023-11-07 ENCOUNTER — OUTPATIENT (OUTPATIENT)
Dept: OUTPATIENT SERVICES | Facility: HOSPITAL | Age: 58
LOS: 1 days | End: 2023-11-07
Payer: SELF-PAY

## 2023-11-07 DIAGNOSIS — E78.49 OTHER HYPERLIPIDEMIA: ICD-10-CM

## 2023-11-07 PROCEDURE — 75571 CT HRT W/O DYE W/CA TEST: CPT

## 2023-11-07 PROCEDURE — 75571 CT HRT W/O DYE W/CA TEST: CPT | Mod: 26

## 2023-11-10 ENCOUNTER — NON-APPOINTMENT (OUTPATIENT)
Age: 58
End: 2023-11-10

## 2023-12-18 ENCOUNTER — OFFICE (OUTPATIENT)
Dept: URBAN - METROPOLITAN AREA CLINIC 29 | Facility: CLINIC | Age: 58
Setting detail: OPHTHALMOLOGY
End: 2023-12-18
Payer: COMMERCIAL
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
## 2023-12-18 PROCEDURE — 10004 FNA BX W/O IMG GDN EA ADDL: CPT | Performed by: OPHTHALMOLOGY

## 2024-03-12 ENCOUNTER — APPOINTMENT (OUTPATIENT)
Dept: ORTHOPEDIC SURGERY | Facility: CLINIC | Age: 59
End: 2024-03-12

## 2024-03-19 ENCOUNTER — OFFICE (OUTPATIENT)
Dept: URBAN - METROPOLITAN AREA CLINIC 29 | Facility: CLINIC | Age: 59
Setting detail: OPHTHALMOLOGY
End: 2024-03-19
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
## 2024-03-19 PROCEDURE — 10004 FNA BX W/O IMG GDN EA ADDL: CPT | Performed by: OPHTHALMOLOGY

## 2024-03-22 ENCOUNTER — APPOINTMENT (OUTPATIENT)
Dept: ORTHOPEDIC SURGERY | Facility: CLINIC | Age: 59
End: 2024-03-22
Payer: COMMERCIAL

## 2024-03-22 VITALS
HEART RATE: 71 BPM | HEIGHT: 64 IN | SYSTOLIC BLOOD PRESSURE: 110 MMHG | BODY MASS INDEX: 18.44 KG/M2 | DIASTOLIC BLOOD PRESSURE: 80 MMHG | WEIGHT: 108 LBS

## 2024-03-22 DIAGNOSIS — M25.562 PAIN IN RIGHT KNEE: ICD-10-CM

## 2024-03-22 DIAGNOSIS — M25.561 PAIN IN RIGHT KNEE: ICD-10-CM

## 2024-03-22 PROCEDURE — 73562 X-RAY EXAM OF KNEE 3: CPT | Mod: 50

## 2024-03-22 PROCEDURE — 99203 OFFICE O/P NEW LOW 30 MIN: CPT

## 2024-04-05 ENCOUNTER — APPOINTMENT (OUTPATIENT)
Dept: CARDIOLOGY | Facility: CLINIC | Age: 59
End: 2024-04-05
Payer: COMMERCIAL

## 2024-04-05 VITALS
BODY MASS INDEX: 18.71 KG/M2 | SYSTOLIC BLOOD PRESSURE: 100 MMHG | HEART RATE: 77 BPM | WEIGHT: 109 LBS | OXYGEN SATURATION: 98 % | DIASTOLIC BLOOD PRESSURE: 76 MMHG

## 2024-04-05 DIAGNOSIS — R07.89 OTHER CHEST PAIN: ICD-10-CM

## 2024-04-05 DIAGNOSIS — E78.49 OTHER HYPERLIPIDEMIA: ICD-10-CM

## 2024-04-05 DIAGNOSIS — I34.1 NONRHEUMATIC MITRAL (VALVE) PROLAPSE: ICD-10-CM

## 2024-04-05 PROCEDURE — 93000 ELECTROCARDIOGRAM COMPLETE: CPT

## 2024-04-05 PROCEDURE — 99214 OFFICE O/P EST MOD 30 MIN: CPT

## 2024-04-05 RX ORDER — SULFAMETHOXAZOLE AND TRIMETHOPRIM 800; 160 MG/1; MG/1
800-160 TABLET ORAL
Qty: 6 | Refills: 0 | Status: DISCONTINUED | COMMUNITY
Start: 2022-02-11 | End: 2024-04-05

## 2024-04-05 RX ORDER — ROSUVASTATIN CALCIUM 5 MG/1
5 TABLET, FILM COATED ORAL DAILY
Qty: 60 | Refills: 1 | Status: ACTIVE | COMMUNITY
Start: 2024-04-05 | End: 1900-01-01

## 2024-04-05 NOTE — HISTORY OF PRESENT ILLNESS
[facility] [place] Neurosurgery Daily Progress Note    Patient: Hemalatha Barber Date: 2021   : 1951 Attending: Martine Wilcox CHLORIDE 110* 115* 115*   CO2 21 19* 19*   GLUCOSE 136* 104* 80   BUN 25* 28* 27*   CREATININE 0.94 0.80 0.71   CALCIUM 8.3* 8.6 9.3   TOTPROTEIN  --   --  6.5   ALBUMIN  --   --  1.7*   BILIRUBIN  --   --  0.3   AST  --   --  15   GPT  --   --  10   ALKPT  --   --  172*       Coags  Recent Labs   Lab 11/20/21  0957   INR 1.1         Microbiology:  Microbiology Results  (Last 10 results in the past 7 days)    Specimen   Gram Smear   Culture Result   Status       11/20/21  1400         No organisms seen.  [P]            Few Polymorphonuclear cells.  [P]            No epithelial cells seen.  [P]            No organisms seen.  [P]            Few Polymorphonuclear cells.  [P]            No epithelial cells seen.  [P]           11/20/21  1332         No organisms seen.  [P]            Few Polymorphonuclear cells.  [P]            No epithelial cells seen.  [P]            No organisms seen.  [P]            Few Polymorphonuclear cells.  [P]            No epithelial cells seen.  [P]                 [P] - Preliminary Result                Imaging:   XR CHEST AP OR PA 1 VIEW    Result Date: 11/20/2021  EXAM: XR CHEST PA OR AP 1 VIEW CLINICAL INDICATION: PICC placement COMPARISON: 11/05/2021 FINDINGS: Cardiomediastinal and hilar contours are stable.  Minimal bibasilar atelectasis.  Interval placement of a left arm PICC with the tip at the distal SVC.  No evidence of pulmonary consolidation or pneumothorax.  Visualized osseous structures and upper abdomen are within normal limits.     Interval left upper extremity PICC placement as described above.  Minimal bibasilar atelectasis. Electronically Signed by: REHANA EATON MD Signed on: 11/20/2021 12:41 PM   '      IMPRESSION/PLAN:  68 y/o F w/ PMHx of Lupus, HTN, HLD, hypothyroid, cervical myelopathy s/p PCDF with Dr. Ross 5/4/21 complicated by epidural hematoma s/p evacuation with Dr. Morley 5/8/21, s/p T11-12 laminectomy, L3-5 TLIF on 9/9/21 by Dr. Ross for thoracic  [FreeTextEntry1] : Patient is a 58-year-old female well-known to my practice for the last 18 years who presents the office today concerned regarding her lipids as in the interval she has had a calcium score noting 70 A units in the LAD distribution.  She has had negative stress test and is more concerned over her family history of heart disease and concern over active CAD.  She has known mitral prolapse, and history of atypical chest pain and at the time of last visit was suggested she proceed with CT angiogram because of her lipids and a family history of sister having an MI which proved to be coronary spasm and no evidence of atherosclerosis.  Patient noted at the time of her last visit that she was in a high altitude environment on vacation, slept above 8000 feet and felt dyspnea and mild tachycardia.  She had the symptoms for approximately 3 days and increased her propranolol dose from 5 mg daily to 5 mg twice daily.  After a few days she felt relief but has remained on 5 twice daily of propranolol.  She is quite active she swims on a regular basis walks and has had no symptomatology suspicious for ongoing ischemia.  Patient is particularly concerned today as her 59-year-old older sister recently had a cardiac event in [place].  Reportedly her coronaries were clean and she had a vasospastic type phenomenon and was discharged on calcium channel blockers.  She is an anesthesiologist under stress; reportedly had an acute MI but cardiac cath revealed clean coronary arteries.  She was placed on calcium channel blocker, statin therapy and aspirin and was told that there was no occlusive disease.  Patient has had a longstanding history of high cholesterol but high HDL risk ratio is at 3.4 but total  with low triglycerides.  She visited Dr. Eleni Merino who suggested a CT angiogram and she was nervous about the radiation and wondered if there was any alternatives.  At that point review recommended a calcium score of her coronaries and based on that she has agreed to initiate statin therapy.  She presents today with no symptoms whatsoever.  She has resumed all of her usual activities.  She is careful on her diet, exercises regularly and has had negative stress test in the past but echocardiography performed this past April has changes consistent with mild mitral prolapse Brendon Ross.

## 2024-04-05 NOTE — REASON FOR VISIT
[Arrhythmia/ECG Abnorrmalities] : arrhythmia/ECG abnormalities [Structural Heart and Valve Disease] : structural heart and valve disease [Spouse] : spouse [FreeTextEntry1] : Patient is a 58-year-old white female with known mitral prolapse, chronic palpitations who presents the office today for routine interval follow-up accompanied by her .  Patient was last seen here in the office in October and she had   concerns over feeling poorly while at high altitude on a recent vacation.  Those symptoms were attributed to lack of adequate nation and today's issues revolve around her cholesterol and mildly elevated calcium score.  Patient presents otherwise with no new or active cardiac concerns or complaints.

## 2024-04-05 NOTE — PHYSICAL EXAM
[Well Developed] : well developed [Well Nourished] : well nourished [No Acute Distress] : no acute distress [Normal Venous Pressure] : normal venous pressure [No Carotid Bruit] : no carotid bruit [Normal S1, S2] : normal S1, S2 [No Rub] : no rub [Murmur] : murmur [Clear Lung Fields] : clear lung fields [Good Air Entry] : good air entry [No Respiratory Distress] : no respiratory distress  [Soft] : abdomen soft [Non Tender] : non-tender [Normal] : no edema, no cyanosis, no clubbing, no varicosities [de-identified] : Midsystolic click and grade 2/6 apical MR murmur

## 2024-04-05 NOTE — DISCUSSION/SUMMARY
[FreeTextEntry1] : Patient is a very pleasant 58 year-old female with a strong family history of CAD, hyperlipidemia with calcium score of 70 in her LAD, known mitral valve prolapse, and recent symptoms at high altitude of dyspnea and tachycardia.  She self administered additional low-dose beta-blocker and after few days of acclamation her symptoms resolved.  She sought an opinion locally and was advised based on current lipid values to have a CT angiogram.  Patient's lipid profile notable for a total cholesterol of 242 ;HDL risk of 3.4 and HDL of 72.  Her 10-year calculated risk of a cardiac event is 1.2% based on her findings on calcium score it was recommended she implement Crestor 5 mg alternate days with co-Q10 200 mg/day.  Updated laboratory data including an hs-CRP will be obtained and patient is comfortable with this approach.  She is aware that if there is any statin intolerance alternatives will be pursued.   Patient patient presents today looking and feeling well without any active symptomatology and is fully active.  She was reassured.  Updated stress test will be considered if symptomatology recurs otherwise advised to follow-up in approximately 6 months.  She is advised to stay on slightly increased dose of low-dose propranolol and will repeat blood test in approximately 6 to 8 weeks after she initiates statin therapy.   Joel Goldberg, MD, FACC [EKG obtained to assist in diagnosis and management of assessed problem(s)] : EKG obtained to assist in diagnosis and management of assessed problem(s)

## 2024-04-05 NOTE — REVIEW OF SYSTEMS
[SOB] : no shortness of breath [Dyspnea on exertion] : not dyspnea during exertion [Chest Discomfort] : no chest discomfort [Lower Ext Edema] : no extremity edema [Leg Claudication] : no intermittent leg claudication [Palpitations] : no palpitations [Orthopnea] : no orthopnea [PND] : no PND [Syncope] : no syncope [Dysuria] : no dysuria [Pelvic Pain] : no pelvic pain [Abn Vaginal Bleeding] : no unexplained vaginal bleeding [Negative] : Neurological

## 2024-04-06 ENCOUNTER — TRANSCRIPTION ENCOUNTER (OUTPATIENT)
Age: 59
End: 2024-04-06

## 2024-04-24 LAB
ALBUMIN SERPL ELPH-MCNC: 4.4 G/DL
ALP BLD-CCNC: 47 U/L
ALT SERPL-CCNC: 11 U/L
AST SERPL-CCNC: 13 U/L
BILIRUB DIRECT SERPL-MCNC: 0.1 MG/DL
BILIRUB INDIRECT SERPL-MCNC: 0.3 MG/DL
BILIRUB SERPL-MCNC: 0.4 MG/DL
CHOLEST SERPL-MCNC: 232 MG/DL
CRP SERPL HS-MCNC: 0.58 MG/L
HDLC SERPL-MCNC: 79 MG/DL
LDLC SERPL CALC-MCNC: 139 MG/DL
NONHDLC SERPL-MCNC: 153 MG/DL
PROT SERPL-MCNC: 6.8 G/DL
TRIGL SERPL-MCNC: 80 MG/DL

## 2024-05-06 ENCOUNTER — APPOINTMENT (OUTPATIENT)
Dept: SURGICAL ONCOLOGY | Facility: CLINIC | Age: 59
End: 2024-05-06
Payer: COMMERCIAL

## 2024-05-06 VITALS
HEIGHT: 64 IN | SYSTOLIC BLOOD PRESSURE: 103 MMHG | RESPIRATION RATE: 16 BRPM | HEART RATE: 74 BPM | OXYGEN SATURATION: 98 % | BODY MASS INDEX: 18.44 KG/M2 | WEIGHT: 108 LBS | DIASTOLIC BLOOD PRESSURE: 68 MMHG

## 2024-05-06 DIAGNOSIS — Z91.89 OTHER SPECIFIED PERSONAL RISK FACTORS, NOT ELSEWHERE CLASSIFIED: ICD-10-CM

## 2024-05-06 PROCEDURE — 99215 OFFICE O/P EST HI 40 MIN: CPT

## 2024-05-15 ENCOUNTER — APPOINTMENT (OUTPATIENT)
Dept: PULMONOLOGY | Facility: CLINIC | Age: 59
End: 2024-05-15
Payer: COMMERCIAL

## 2024-05-15 VITALS
OXYGEN SATURATION: 98 % | HEIGHT: 64 IN | SYSTOLIC BLOOD PRESSURE: 100 MMHG | RESPIRATION RATE: 16 BRPM | TEMPERATURE: 97.4 F | HEART RATE: 60 BPM | DIASTOLIC BLOOD PRESSURE: 60 MMHG | WEIGHT: 108 LBS | BODY MASS INDEX: 18.44 KG/M2

## 2024-05-15 DIAGNOSIS — Z83.49 FAMILY HISTORY OF OTHER ENDOCRINE, NUTRITIONAL AND METABOLIC DISEASES: ICD-10-CM

## 2024-05-15 DIAGNOSIS — Q87.89 OTHER SPECIFIED CONGENITAL MALFORMATION SYNDROMES, NOT ELSEWHERE CLASSIFIED: ICD-10-CM

## 2024-05-15 DIAGNOSIS — Q82.8 OTHER SPECIFIED CONGENITAL MALFORMATION SYNDROMES, NOT ELSEWHERE CLASSIFIED: ICD-10-CM

## 2024-05-15 DIAGNOSIS — Z83.438 FAMILY HISTORY OF OTHER DISORDER OF LIPOPROTEIN METABOLISM AND OTHER LIPIDEMIA: ICD-10-CM

## 2024-05-15 DIAGNOSIS — Q79.9 OTHER SPECIFIED CONGENITAL MALFORMATION SYNDROMES, NOT ELSEWHERE CLASSIFIED: ICD-10-CM

## 2024-05-15 DIAGNOSIS — J30.2 OTHER SEASONAL ALLERGIC RHINITIS: ICD-10-CM

## 2024-05-15 DIAGNOSIS — Q25.40 OTHER SPECIFIED CONGENITAL MALFORMATION SYNDROMES, NOT ELSEWHERE CLASSIFIED: ICD-10-CM

## 2024-05-15 DIAGNOSIS — Z86.79 PERSONAL HISTORY OF OTHER DISEASES OF THE CIRCULATORY SYSTEM: ICD-10-CM

## 2024-05-15 DIAGNOSIS — Z72.820 SLEEP DEPRIVATION: ICD-10-CM

## 2024-05-15 DIAGNOSIS — Z82.49 FAMILY HISTORY OF ISCHEMIC HEART DISEASE AND OTHER DISEASES OF THE CIRCULATORY SYSTEM: ICD-10-CM

## 2024-05-15 DIAGNOSIS — Z86.16 PERSONAL HISTORY OF COVID-19: ICD-10-CM

## 2024-05-15 DIAGNOSIS — N60.99 UNSPECIFIED BENIGN MAMMARY DYSPLASIA OF UNSPECIFIED BREAST: ICD-10-CM

## 2024-05-15 DIAGNOSIS — T70.20XA UNSPECIFIED EFFECTS OF HIGH ALTITUDE, INITIAL ENCOUNTER: ICD-10-CM

## 2024-05-15 DIAGNOSIS — R91.8 OTHER NONSPECIFIC ABNORMAL FINDING OF LUNG FIELD: ICD-10-CM

## 2024-05-15 DIAGNOSIS — Z86.39 PERSONAL HISTORY OF OTHER ENDOCRINE, NUTRITIONAL AND METABOLIC DISEASE: ICD-10-CM

## 2024-05-15 DIAGNOSIS — Z78.0 ASYMPTOMATIC MENOPAUSAL STATE: ICD-10-CM

## 2024-05-15 DIAGNOSIS — U07.1 COVID-19: ICD-10-CM

## 2024-05-15 DIAGNOSIS — G43.909 MIGRAINE, UNSPECIFIED, NOT INTRACTABLE, W/OUT STATUS MIGRAINOSUS: ICD-10-CM

## 2024-05-15 DIAGNOSIS — Z82.5 FAMILY HISTORY OF ASTHMA AND OTHER CHRONIC LOWER RESPIRATORY DISEASES: ICD-10-CM

## 2024-05-15 DIAGNOSIS — R06.02 SHORTNESS OF BREATH: ICD-10-CM

## 2024-05-15 DIAGNOSIS — I34.1 OTHER SPECIFIED CONGENITAL MALFORMATION SYNDROMES, NOT ELSEWHERE CLASSIFIED: ICD-10-CM

## 2024-05-15 PROCEDURE — 94060 EVALUATION OF WHEEZING: CPT

## 2024-05-15 PROCEDURE — 94727 GAS DIL/WSHOT DETER LNG VOL: CPT

## 2024-05-15 PROCEDURE — 99204 OFFICE O/P NEW MOD 45 MIN: CPT | Mod: 25

## 2024-05-15 PROCEDURE — 94618 PULMONARY STRESS TESTING: CPT

## 2024-05-15 PROCEDURE — 94729 DIFFUSING CAPACITY: CPT

## 2024-05-15 PROCEDURE — 71046 X-RAY EXAM CHEST 2 VIEWS: CPT

## 2024-05-15 PROCEDURE — 95012 NITRIC OXIDE EXP GAS DETER: CPT

## 2024-05-15 PROCEDURE — ZZZZZ: CPT

## 2024-05-15 NOTE — REASON FOR VISIT
[Initial] : an initial visit [TextBox_44] : abnormal CT of the chest (prominent right pulmonary nodule), seasonal allergies, altitude sensativity, ?SO.

## 2024-05-15 NOTE — ASSESSMENT
[FreeTextEntry1] : Ms. SHANKS is a 58 year old female with a history of COVID 19 (2021 and 2024), MVP, benign breast disease, migraines, HLD, abnormal CT of the chest, post menopause, altitude sensitivity presenting to the office today for an initial pulmonary evaluation for abnormal CT of the chest (prominent right pulmonary nodule), seasonal allergies, altitude sensativity, ?SO.   problem 1: abnormal CT of the chest (dominate 7mm nodule) DDx: 1. hamartoma 2. carcinoid 3. cancer   -complete dedicated CT of the chest (no contrast, low dose) -complete QuantiFERON gold test, ACE level, hypersensitivity panel  -CAT scans are the only radiological modality to identify abnormalities within the lungs with regards to nodules/masses/lymph nodes. Risks, benefits were reviewed in detail. The guidelines for abnormalities include follow up CT scans at various intervals which could range from 6 weeks to 1 year intervals. If there is a change for the worse then consideration for a biopsy will be considered if the patient is a candidate. Second opinion evaluation with a thoracic surgeon or an interventional radiologist could be offered.   problem 2: Allergies/Sinus -add Olopatadine 0.6% 1 sniff BID -add Xlear saline nasal spray -Environmental measures for allergies were encouraged including mattress and pillow covers, air purifier, and environmental controls   Problem 3: ?SO (elevated Mallampati class, poor quality sleep, snoring) -complete home sleep study -Sleep apnea is associated with adverse clinical consequences which can affect most organ systems. Cardiovascular disease risk includes arrhythmias, atrial fibrillation, hypertension, coronary artery disease, and stroke. Metabolic disorders include diabetes type 2, non-alcoholic fatty liver disease. Mood disorder especially depression; and cognitive decline especially in the elderly. Associations with chronic reflux/Barretts esophagus some but not all inclusive. -Reasons include arousal consistent with hypopnea; respiratory events most prominent in REM sleep or supine position; therefore sleep staging and body position are important for accurate diagnosis and estimation of AHI.   problem 4: cardiac disease -recommended to continue to follow up with Cardiologist   problem 5: poor breathing mechanics -Proper breathing techniques were reviewed with an emphasis of exhalation. Patient instructed to breathe in for 1 second and out for 4 seconds. Patient was encouraged to not talk while walking.  problem 5 A: anxiety  -recommended to read "The Gift of Maybe" by Zaina Lund -Recommended Wim Hof and Buteyko breathing technique  problem 6: altitude sickness -recommended adequate hydration -Recommended Wim Hof and Buteyko breathing technique   problem 7: out of shape -Weight loss, exercise, and diet control were discussed and are highly encouraged. Treatment options are given such as, aqua therapy, and contacting a nutritionist. Recommended to use the elliptical, stationary bike, less use of treadmill.   problem 8: health maintenance -recommended yearly flu shot after October 15, 2023 -recommended strep pneumonia vaccines: Prevnar-20 vaccine, followed by Pneumo vaccine 23 one year following after 65 years old. -recommended early intervention for Upper Respiratory Infections (URIs) -recommended regular osteoporosis evaluations -recommended early dermatological evaluations -recommended after the age of 50 to the age of 70, colonoscopy every 5 years   F/P in 6-8 weeks. She is encouraged to call with any changes, concerns, or questions

## 2024-05-15 NOTE — ADDENDUM
[FreeTextEntry1] : Documented by Tavo Ruby acting as a scribe for Dr. Christopher Arriaga on 05/15/2024. All medical record entries made by the Scribe were at my, Dr. Christopher Arriaga's, direction and personally dictated by me on 05/15/2024. I have reviewed the chart and agree that the record accurately reflects my personal performance of the history, physical exam, assessment and plan. I have also personally directed, reviewed, and agree with the discharge instructions.

## 2024-05-15 NOTE — PHYSICAL EXAM
[No Acute Distress] : no acute distress [Normal Oropharynx] : normal oropharynx [III] : Mallampati Class: III [Normal Appearance] : normal appearance [No Neck Mass] : no neck mass [Normal Rate/Rhythm] : normal rate/rhythm [Normal S1, S2] : normal s1, s2 [No Murmurs] : no murmurs [No Resp Distress] : no resp distress [Clear to Auscultation Bilaterally] : clear to auscultation bilaterally [No Abnormalities] : no abnormalities [Benign] : benign [Normal Gait] : normal gait [No Clubbing] : no clubbing [No Cyanosis] : no cyanosis [No Edema] : no edema [FROM] : FROM [Normal Color/ Pigmentation] : normal color/ pigmentation [No Focal Deficits] : no focal deficits [Oriented x3] : oriented x3 [Normal Affect] : normal affect [TextBox_2] : thin [TextBox_68] : I:E ratio 1:3; clear

## 2024-05-15 NOTE — HISTORY OF PRESENT ILLNESS
[TextBox_4] : Ms. SHANKS is a 58 year old female with a history of COVID 19 (2021 and 2024), MVP, benign breast disease, migraines, HLD, abnormal CT of the chest, post menopause, altitude sensitivity  presenting to the office today for an initial pulmonary evaluation for abnormal CT of the chest (prominent right pulmonary nodule), seasonal allergies, altitude sensativity, ?SO. Her chief complaint is  -she notes going for a calcium scan  -she notes abnormal CT of the chest  -she notes her breathing is stable -she notes SOB with altitude changes  -she notes cardiac issues  -she notes chest pain and pressure with exacerbation  -she notes seasonal allergies  -she denies globus pharyngeus -she notes energy levels are poor -she notes frequent fatigue  -she notes good quality of sleep -she notes sleeping for 8-9 hours  -she notes ability to fall asleep while watching TV and while in a car -she denies snoring  -she notes her memory and concentration are stable -she notes her senses of smell and taste are stable -she notes vision is stable -she notes frequent worry   -she denies any headaches, nausea, emesis, fever, chills, sweats, coughing, wheezing, palpitations, constipation, diarrhea, vertigo, dysphagia, heartburn, reflux, itchy eyes, itchy ears, leg swelling, leg pain, arthralgias, myalgias, or sour taste in the mouth.

## 2024-05-22 ENCOUNTER — TRANSCRIPTION ENCOUNTER (OUTPATIENT)
Age: 59
End: 2024-05-22

## 2024-05-22 ENCOUNTER — LABORATORY RESULT (OUTPATIENT)
Age: 59
End: 2024-05-22

## 2024-05-22 DIAGNOSIS — R89.9 UNSPECIFIED ABNORMAL FINDING IN SPECIMENS FROM OTHER ORGANS, SYSTEMS AND TISSUES: ICD-10-CM

## 2024-05-22 LAB
24R-OH-CALCIDIOL SERPL-MCNC: 57.8 PG/ML
25(OH)D3 SERPL-MCNC: 28.6 NG/ML
A1AT SERPL-MCNC: 128 MG/DL
BASOPHILS # BLD AUTO: 0.14 K/UL
BASOPHILS NFR BLD AUTO: 1.8 %
EOSINOPHIL # BLD AUTO: 0.52 K/UL
EOSINOPHIL NFR BLD AUTO: 6.7 %
HCT VFR BLD CALC: 43.7 %
HGB BLD-MCNC: 14.2 G/DL
IMM GRANULOCYTES NFR BLD AUTO: 0.3 %
LYMPHOCYTES # BLD AUTO: 2.59 K/UL
LYMPHOCYTES NFR BLD AUTO: 33.4 %
MAN DIFF?: NORMAL
MCHC RBC-ENTMCNC: 29.2 PG
MCHC RBC-ENTMCNC: 32.5 GM/DL
MCV RBC AUTO: 89.7 FL
MONOCYTES # BLD AUTO: 0.57 K/UL
MONOCYTES NFR BLD AUTO: 7.4 %
NEUTROPHILS # BLD AUTO: 3.91 K/UL
NEUTROPHILS NFR BLD AUTO: 50.4 %
PLATELET # BLD AUTO: 347 K/UL
RBC # BLD: 4.87 M/UL
RBC # FLD: 12.5 %
WBC # FLD AUTO: 7.75 K/UL

## 2024-05-23 ENCOUNTER — TRANSCRIPTION ENCOUNTER (OUTPATIENT)
Age: 59
End: 2024-05-23

## 2024-05-23 LAB — ACE BLD-CCNC: 68 U/L

## 2024-05-24 ENCOUNTER — TRANSCRIPTION ENCOUNTER (OUTPATIENT)
Age: 59
End: 2024-05-24

## 2024-05-24 LAB
A ALTERNATA IGE QN: <0.1 KUA/L
A FUMIGATUS IGE QN: <0.1 KUA/L
ALMOND IGE QN: <0.1 KUA/L
ALTERN TENCAPG(M6): 19.2 MCG/ML
ASPER FUMCAPG(M3): 175 MCG/ML
AUREOBASCAPG(M12): 15.7 MCG/ML
BRAZIL NUT IGE QN: <0.1 KUA/L
C ALBICANS IGE QN: <0.1 KUA/L
C HERBARUM IGE QN: <0.1 KUA/L
CASHEW NUT IGE QN: <0.1 KUA/L
CAT DANDER IGE QN: <0.1 KUA/L
CODFISH IGE QN: <0.1 KUA/L
COMMON RAGWEED IGE QN: <0.1 KUA/L
COW MILK IGE QN: <0.1 KUA/L
D FARINAE IGE QN: <0.1 KUA/L
D PTERONYSS IGE QN: <0.1 KUA/L
DEPRECATED A ALTERNATA IGE RAST QL: 0 (ref 0–?)
DEPRECATED A FUMIGATUS IGE RAST QL: 0 (ref 0–?)
DEPRECATED ALMOND IGE RAST QL: 0 (ref 0–?)
DEPRECATED BRAZIL NUT IGE RAST QL: 0 (ref 0–?)
DEPRECATED C ALBICANS IGE RAST QL: 0
DEPRECATED C HERBARUM IGE RAST QL: 0 (ref 0–?)
DEPRECATED CASHEW NUT IGE RAST QL: 0 (ref 0–?)
DEPRECATED CAT DANDER IGE RAST QL: 0 (ref 0–?)
DEPRECATED CODFISH IGE RAST QL: 0 (ref 0–?)
DEPRECATED COMMON RAGWEED IGE RAST QL: 0 (ref 0–?)
DEPRECATED COW MILK IGE RAST QL: 0 (ref 0–?)
DEPRECATED D FARINAE IGE RAST QL: 0 (ref 0–?)
DEPRECATED D PTERONYSS IGE RAST QL: 0 (ref 0–?)
DEPRECATED DOG DANDER IGE RAST QL: 0 (ref 0–?)
DEPRECATED DUCK FEATHER IGE RAST QL: 0
DEPRECATED EGG WHITE IGE RAST QL: 0 (ref 0–?)
DEPRECATED GOOSE FEATHER IGE RAST QL: 0
DEPRECATED HAZELNUT IGE RAST QL: 0 (ref 0–?)
DEPRECATED M RACEMOSUS IGE RAST QL: 0
DEPRECATED PEANUT IGE RAST QL: 0 (ref 0–?)
DEPRECATED ROACH IGE RAST QL: 0 (ref 0–?)
DEPRECATED SALMON IGE RAST QL: 0 (ref 0–?)
DEPRECATED SCALLOP IGE RAST QL: <0.1 KUA/L
DEPRECATED SESAME SEED IGE RAST QL: 0 (ref 0–?)
DEPRECATED SHRIMP IGE RAST QL: 0 (ref 0–?)
DEPRECATED SOYBEAN IGE RAST QL: 0 (ref 0–?)
DEPRECATED TIMOTHY IGE RAST QL: 0 (ref 0–?)
DEPRECATED TUNA IGE RAST QL: 0 (ref 0–?)
DEPRECATED WALNUT IGE RAST QL: 0 (ref 0–?)
DEPRECATED WHEAT IGE RAST QL: 0 (ref 0–?)
DEPRECATED WHITE OAK IGE RAST QL: 0 (ref 0–?)
DOG DANDER IGE QN: <0.1 KUA/L
DUCK FEATHER IGE QN: <0.1 KUA/L
EGG WHITE IGE QN: <0.1 KUA/L
GOOSE FEATHER IGE QN: <0.1 KUA/L
HAZELNUT IGE QN: <0.1 KUA/L
LACEYELLA SACCHARI IGG: 14.9 MCG/ML
M RACEMOSUS IGE QN: <0.1 KUA/L
MICROPOLYCAPG(M22): 3.2 MCG/ML
PEANUT IGE QN: <0.1 KUA/L
PENIC CHRYCAPG(M1): 86.5 MCG/ML
PHOMA BETAE IGG: 24.7 MCG/ML
ROACH IGE QN: <0.1 KUA/L
SALMON IGE QN: <0.1 KUA/L
SCALLOP IGE QN: 0 (ref 0–?)
SCALLOP IGE QN: <0.1 KUA/L
SESAME SEED IGE QN: <0.1 KUA/L
SOYBEAN IGE QN: <0.1 KUA/L
TIMOTHY IGE QN: <0.1 KUA/L
TOTAL IGE SMQN RAST: 9 KU/L
TRICHODERMA VIRIDE IGG: 10.7 MCG/ML
TUNA IGE QN: <0.1 KUA/L
WALNUT IGE QN: <0.1 KUA/L
WHEAT IGE QN: <0.1 KUA/L
WHITE OAK IGE QN: <0.1 KUA/L

## 2024-05-25 LAB — STRONGYLOIDES AB SER IA-ACNC: NEGATIVE

## 2024-05-26 LAB
M TB IFN-G BLD-IMP: NEGATIVE
QUANTIFERON TB PLUS MITOGEN MINUS NIL: 2.48 IU/ML
QUANTIFERON TB PLUS NIL: 0.02 IU/ML
QUANTIFERON TB PLUS TB1 MINUS NIL: 0 IU/ML
QUANTIFERON TB PLUS TB2 MINUS NIL: 0 IU/ML

## 2024-05-28 ENCOUNTER — TRANSCRIPTION ENCOUNTER (OUTPATIENT)
Age: 59
End: 2024-05-28

## 2024-05-28 ENCOUNTER — OUTPATIENT (OUTPATIENT)
Dept: OUTPATIENT SERVICES | Facility: HOSPITAL | Age: 59
LOS: 1 days | End: 2024-05-28
Payer: COMMERCIAL

## 2024-05-28 ENCOUNTER — APPOINTMENT (OUTPATIENT)
Dept: CT IMAGING | Facility: CLINIC | Age: 59
End: 2024-05-28
Payer: COMMERCIAL

## 2024-05-28 DIAGNOSIS — Z00.8 ENCOUNTER FOR OTHER GENERAL EXAMINATION: ICD-10-CM

## 2024-05-28 DIAGNOSIS — R91.8 OTHER NONSPECIFIC ABNORMAL FINDING OF LUNG FIELD: ICD-10-CM

## 2024-05-28 PROCEDURE — 71250 CT THORAX DX C-: CPT | Mod: 26

## 2024-05-28 PROCEDURE — 71250 CT THORAX DX C-: CPT

## 2024-05-29 LAB
A1AT PHENOTYP SERPL-IMP: NORMAL
A1AT SERPL-MCNC: 127 MG/DL

## 2024-06-03 ENCOUNTER — TRANSCRIPTION ENCOUNTER (OUTPATIENT)
Age: 59
End: 2024-06-03

## 2024-06-03 LAB
A ALTERNATA IGE QN: <0.1 KUA/L
A FUMIGATUS IGE QN: <0.1 KUA/L
BERMUDA GRASS IGE QN: <0.1 KUA/L
BOXELDER IGE QN: <0.1 KUA/L
C HERBARUM IGE QN: <0.1 KUA/L
CALIF WALNUT IGE QN: <0.1 KUA/L
CAT DANDER IGE QN: <0.1 KUA/L
CMN PIGWEED IGE QN: <0.1 KUA/L
COMMON RAGWEED IGE QN: <0.1 KUA/L
COTTONWOOD IGE QN: <0.1 KUA/L
D FARINAE IGE QN: <0.1 KUA/L
D PTERONYSS IGE QN: <0.1 KUA/L
DEPRECATED A ALTERNATA IGE RAST QL: 0 (ref 0–?)
DEPRECATED A FUMIGATUS IGE RAST QL: 0 (ref 0–?)
DEPRECATED BERMUDA GRASS IGE RAST QL: 0 (ref 0–?)
DEPRECATED BOXELDER IGE RAST QL: 0 (ref 0–?)
DEPRECATED C HERBARUM IGE RAST QL: 0 (ref 0–?)
DEPRECATED CAT DANDER IGE RAST QL: 0 (ref 0–?)
DEPRECATED COMMON PIGWEED IGE RAST QL: 0 (ref 0–?)
DEPRECATED COMMON RAGWEED IGE RAST QL: 0 (ref 0–?)
DEPRECATED COTTONWOOD IGE RAST QL: 0 (ref 0–?)
DEPRECATED D FARINAE IGE RAST QL: 0 (ref 0–?)
DEPRECATED D PTERONYSS IGE RAST QL: 0 (ref 0–?)
DEPRECATED DOG DANDER IGE RAST QL: 0 (ref 0–?)
DEPRECATED GOOSEFOOT IGE RAST QL: 0 (ref 0–?)
DEPRECATED LONDON PLANE IGE RAST QL: 0 (ref 0–?)
DEPRECATED MOUSE URINE PROT IGE RAST QL: 0 (ref 0–?)
DEPRECATED MUGWORT IGE RAST QL: 0 (ref 0–?)
DEPRECATED P NOTATUM IGE RAST QL: 0 (ref 0–?)
DEPRECATED RED CEDAR IGE RAST QL: 0 (ref 0–?)
DEPRECATED ROACH IGE RAST QL: 0 (ref 0–?)
DEPRECATED SHEEP SORREL IGE RAST QL: 0 (ref 0–?)
DEPRECATED SILVER BIRCH IGE RAST QL: 0 (ref 0–?)
DEPRECATED TIMOTHY IGE RAST QL: 0 (ref 0–?)
DEPRECATED WHITE ASH IGE RAST QL: 0 (ref 0–?)
DEPRECATED WHITE OAK IGE RAST QL: 0 (ref 0–?)
DOG DANDER IGE QN: <0.1 KUA/L
GOOSEFOOT IGE QN: <0.1 KUA/L
LONDON PLANE IGE QN: <0.1 KUA/L
MOUSE URINE PROT IGE QN: <0.1 KUA/L
MUGWORT IGE QN: <0.1 KUA/L
MULBERRY (T70) CLASS: 0 (ref 0–?)
MULBERRY (T70) CONC: <0.1 KUA/L
P NOTATUM IGE QN: <0.1 KUA/L
RED CEDAR IGE QN: <0.1 KUA/L
ROACH IGE QN: <0.1 KUA/L
SHEEP SORREL IGE QN: <0.1 KUA/L
SILVER BIRCH IGE QN: <0.1 KUA/L
TIMOTHY IGE QN: <0.1 KUA/L
TOTAL IGE SMQN RAST: 9 KU/L
TREE ALLERG MIX1 IGE QL: 0 (ref 0–?)
WHITE ASH IGE QN: <0.1 KUA/L
WHITE ELM IGE QN: 0 (ref 0–?)
WHITE ELM IGE QN: <0.1 KUA/L
WHITE OAK IGE QN: <0.1 KUA/L

## 2024-06-05 LAB
ANNOTATION COMMENT IMP: NORMAL
ELECTRONIC SIGNATURE: NORMAL
SERPINA1 GENE MUT TESTED BLD/T: NORMAL

## 2024-06-11 ENCOUNTER — OUTPATIENT (OUTPATIENT)
Dept: OUTPATIENT SERVICES | Facility: HOSPITAL | Age: 59
LOS: 1 days | End: 2024-06-11
Payer: COMMERCIAL

## 2024-06-11 ENCOUNTER — APPOINTMENT (OUTPATIENT)
Dept: SLEEP CENTER | Facility: CLINIC | Age: 59
End: 2024-06-11
Payer: COMMERCIAL

## 2024-06-11 PROCEDURE — 95800 SLP STDY UNATTENDED: CPT

## 2024-06-11 PROCEDURE — 95800 SLP STDY UNATTENDED: CPT | Mod: 26

## 2024-06-11 RX ORDER — OLOPATADINE HYDROCHLORIDE 665 UG/1
0.6 SPRAY, METERED NASAL
Qty: 3 | Refills: 1 | Status: ACTIVE | COMMUNITY
Start: 2024-05-15 | End: 1900-01-01

## 2024-06-17 ENCOUNTER — TRANSCRIPTION ENCOUNTER (OUTPATIENT)
Age: 59
End: 2024-06-17

## 2024-06-17 DIAGNOSIS — G47.33 OBSTRUCTIVE SLEEP APNEA (ADULT) (PEDIATRIC): ICD-10-CM

## 2024-06-19 ENCOUNTER — TRANSCRIPTION ENCOUNTER (OUTPATIENT)
Age: 59
End: 2024-06-19

## 2024-06-19 ENCOUNTER — OFFICE (OUTPATIENT)
Dept: URBAN - METROPOLITAN AREA CLINIC 29 | Facility: CLINIC | Age: 59
Setting detail: OPHTHALMOLOGY
End: 2024-06-19
Payer: COMMERCIAL
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
## 2024-06-19 PROCEDURE — 10004 FNA BX W/O IMG GDN EA ADDL: CPT | Performed by: OPHTHALMOLOGY

## 2024-06-20 ENCOUNTER — TRANSCRIPTION ENCOUNTER (OUTPATIENT)
Age: 59
End: 2024-06-20

## 2024-06-20 NOTE — REASON FOR VISIT
[Follow-Up Visit] : a follow-up visit for [Other: _____] : [unfilled] [FreeTextEntry2] : Annual breast examination, breast cancer risk score = 26.7 (she prefers NOT to have breast MRIs).

## 2024-06-20 NOTE — PHYSICAL EXAM
[Normal] : supple, no neck mass and thyroid not enlarged [Normal Neck Lymph Nodes] : normal neck lymph nodes  [Normal Supraclavicular Lymph Nodes] : normal supraclavicular lymph nodes [Normal Axillary Lymph Nodes] : normal axillary lymph nodes [Normal] : normal appearance, no rash, nodules, vesicles, ulcers, erythema [de-identified] : Groins not examined

## 2024-06-20 NOTE — ASSESSMENT
[FreeTextEntry1] : 58-year-old lady.  Annual breast examination.  Breast cancer risk score = 26.7.  Prefers not to have breast MRIs.  December 2023: Bilateral mammogram and sonogram at [facility]: No worrisome findings. Since the ~5-month interval between the above imaging, and her current presentation, I have suggested a diagnostic right mammogram and ultrasound. She understands and agrees. Prescription provided.  I have asked her to call me a week after that imaging to discuss the results.  If the mammogram and sonogram did not offer an explanation for signs or symptoms, there may be a value/role to a breast MRI....................   Reviewed in detail, all questions answered.  Further management based upon above outcomes.   6/18/2024: Diagnostic right mammogram and sonogram at [facility]: BI-RADS 3. No suspicious radiographic or sonographic findings. Annual bilateral mammogram and sonogram recommended for December 2024. Prescriptions mailed June 20, 2024.

## 2024-06-20 NOTE — HISTORY OF PRESENT ILLNESS
[de-identified] : 58-year-old lady.  Breast cancer risk score = 26.7.  She returns for annual breast examination today.   CC: Since ~end of March 2024, she has had intermittent discomfort in the tail of the breast on the right side. No preceding injury or strain. No provocative or palliative factors. No other signs or symptoms related to either breast. Today she is asymptomatic with a normal BSE.   We have followed for periodic breast examinations since March 2000.  + Previous history of mastalgia without associated findings on examination or imaging. Symptoms were self-limited.  Potential factors associated with mastalgia: Caffeine intake: 2 beverages daily. Non-smoker. No exogenous hormones.   + Prior personal history: She had a left breast aspiration at [facility] in March 2016. No other procedures related to either breast.   No personal history of malignancy.   +FH: A paternal cousin had breast cancer.  No relatives with ovarian cancer  Not Ashkenazi.   Other relatives with a history of malignancy: Father had nonmelanoma skin cancer.   Menarche was at age 14 A single pregnancy was delivered at age 32.   PMD: Dr. Jacqueline Kim. She used to see Dr. Linda Flores, who retired in the spring 2022.  + ALLERGIC: Penicillin. Latex.  She does not have a pacemaker or defibrillator. She takes no anticoagulants.  Her only current medication is propranolol, for MVP.  Cardiology: Dr. Joel GOLDBERG.   GYN: Dr. Teofilo BURGESS. September 2023 visit was unremarkable.  Previously she saw Dr. Yared Bass, then Dr. Ashlee Royal   c-scope: March 2019: Dr. Luis M segovia x7 years

## 2024-06-20 NOTE — REVIEW OF SYSTEMS
[Negative] : Endocrine [FreeTextEntry5] : Palpitations [FreeTextEntry7] : Penicillin latex [FreeTextEntry1] : Increased risk of breast cancer

## 2024-07-15 ENCOUNTER — APPOINTMENT (OUTPATIENT)
Dept: PEDIATRIC ALLERGY IMMUNOLOGY | Facility: CLINIC | Age: 59
End: 2024-07-15
Payer: COMMERCIAL

## 2024-07-15 VITALS
DIASTOLIC BLOOD PRESSURE: 72 MMHG | SYSTOLIC BLOOD PRESSURE: 114 MMHG | BODY MASS INDEX: 18.21 KG/M2 | HEART RATE: 72 BPM | OXYGEN SATURATION: 98 % | WEIGHT: 108 LBS | HEIGHT: 64.5 IN

## 2024-07-15 DIAGNOSIS — R89.9 UNSPECIFIED ABNORMAL FINDING IN SPECIMENS FROM OTHER ORGANS, SYSTEMS AND TISSUES: ICD-10-CM

## 2024-07-15 PROCEDURE — 99204 OFFICE O/P NEW MOD 45 MIN: CPT

## 2024-07-16 ENCOUNTER — APPOINTMENT (OUTPATIENT)
Dept: PULMONOLOGY | Facility: CLINIC | Age: 59
End: 2024-07-16
Payer: COMMERCIAL

## 2024-07-16 VITALS
BODY MASS INDEX: 18.44 KG/M2 | SYSTOLIC BLOOD PRESSURE: 110 MMHG | OXYGEN SATURATION: 96 % | TEMPERATURE: 97.3 F | HEART RATE: 84 BPM | WEIGHT: 108 LBS | RESPIRATION RATE: 16 BRPM | HEIGHT: 64 IN | DIASTOLIC BLOOD PRESSURE: 72 MMHG

## 2024-07-16 DIAGNOSIS — R91.8 OTHER NONSPECIFIC ABNORMAL FINDING OF LUNG FIELD: ICD-10-CM

## 2024-07-16 DIAGNOSIS — J30.2 OTHER SEASONAL ALLERGIC RHINITIS: ICD-10-CM

## 2024-07-16 DIAGNOSIS — Z72.820 SLEEP DEPRIVATION: ICD-10-CM

## 2024-07-16 DIAGNOSIS — T70.20XA UNSPECIFIED EFFECTS OF HIGH ALTITUDE, INITIAL ENCOUNTER: ICD-10-CM

## 2024-07-16 PROCEDURE — 99214 OFFICE O/P EST MOD 30 MIN: CPT

## 2024-08-05 ENCOUNTER — RX RENEWAL (OUTPATIENT)
Age: 59
End: 2024-08-05

## 2024-09-16 ENCOUNTER — OFFICE (OUTPATIENT)
Dept: URBAN - METROPOLITAN AREA CLINIC 29 | Facility: CLINIC | Age: 59
Setting detail: OPHTHALMOLOGY
End: 2024-09-16
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
## 2024-09-16 PROCEDURE — 10004 FNA BX W/O IMG GDN EA ADDL: CPT | Performed by: OPHTHALMOLOGY

## 2024-09-25 ENCOUNTER — NON-APPOINTMENT (OUTPATIENT)
Age: 59
End: 2024-09-25

## 2024-09-25 ENCOUNTER — APPOINTMENT (OUTPATIENT)
Dept: CARDIOLOGY | Facility: CLINIC | Age: 59
End: 2024-09-25
Payer: COMMERCIAL

## 2024-09-25 VITALS
OXYGEN SATURATION: 98 % | WEIGHT: 110 LBS | BODY MASS INDEX: 18.88 KG/M2 | SYSTOLIC BLOOD PRESSURE: 126 MMHG | HEART RATE: 72 BPM | DIASTOLIC BLOOD PRESSURE: 78 MMHG

## 2024-09-25 DIAGNOSIS — I34.1 NONRHEUMATIC MITRAL (VALVE) PROLAPSE: ICD-10-CM

## 2024-09-25 DIAGNOSIS — R07.89 OTHER CHEST PAIN: ICD-10-CM

## 2024-09-25 DIAGNOSIS — R91.8 OTHER NONSPECIFIC ABNORMAL FINDING OF LUNG FIELD: ICD-10-CM

## 2024-09-25 DIAGNOSIS — E78.49 OTHER HYPERLIPIDEMIA: ICD-10-CM

## 2024-09-25 PROCEDURE — 99214 OFFICE O/P EST MOD 30 MIN: CPT

## 2024-09-25 PROCEDURE — 93000 ELECTROCARDIOGRAM COMPLETE: CPT

## 2024-09-25 NOTE — HISTORY OF PRESENT ILLNESS
[FreeTextEntry1] : Patient is a 57-year-old female well-known to my practice for the last 18 years who presents the office today to continue her ongoing care and to reevaluate her response to initiation of statin therapy.  She has known mitral valve prolapse.  She recently saw Dr. Arriaga for pulmonary issues as she has had a longstanding history of atypical chest pain and presented last visit to review her CT angiogram which was notable for a small pulmonary nodule.  She was referred to Dr. Arriaga, he did extensive evaluation and nothing was found.  She was reassured and will follow-up at a semiannual basis with him for reevaluation of what is considered to be a stable nodule.  Patient notes that she recently was in a high altitude environment on vacation, slept above 8000 feet and felt dyspnea and mild tachycardia.  She had the symptoms for approximately 3 days and increased her propranolol dose from 5 mg daily to 5 mg twice daily.  After a few days she felt relief but has remained on 5 twice daily of propranolol.  She is quite active she swims on a regular basis walks and has had no symptomatology suspicious for ongoing ischemia.  She currently is on 10 mg 3 times a day a propranolol and feels perfectly well denying palpitations, atypical chest pain, dyspnea or any type of lightheadedness.  Patient continues to be anxious and concerned about her cardiovascular health as her older sister by 3 years recently had a cardiac event in [place].  She is an anesthesiologist under stress; reportedly had an acute MI but cardiac cath revealed clean coronary arteries.  She was placed on calcium channel blocker, statin therapy and aspirin and was told that there was no occlusive disease.  Patient has had a longstanding history of high cholesterol but high HDL risk ratio is at 3.4 but total  with low triglycerides.  She visited Dr. Eleni Merino who suggested a CT angiogram and she was nervous about the radiation and wondered if there was any alternatives.  She did in fact have the CTA which revealed a low risk lesion but presence of atherosclerotic plaque.  She presents today with no symptoms whatsoever.  She has resumed all of her usual activities.  She is careful on her diet, exercises regularly and has had negative stress test in the past but echocardiography performed this past April has changes consistent with mild mitral prolapse Crestor 3 times per week was implemented, she is tolerating it well and her LDL is nearly ideal at 109 on today's blood work which is all scanned into the record.

## 2024-09-25 NOTE — CARDIOLOGY SUMMARY
[de-identified] : (9/25/2024) EKG: NSR at 64 beats minute with a frontal QRS axis of 0 degrees.  Borderline poor R progression otherwise normal trace.

## 2024-09-25 NOTE — REASON FOR VISIT
[Structural Heart and Valve Disease] : structural heart and valve disease [Hyperlipidemia] : hyperlipidemia [Coronary Artery Disease] : coronary artery disease [FreeTextEntry1] : Patient is a 58-year-old white female with known mitral prolapse, chronic palpitations who presents the office today for routine interval follow-up and to reevaluate her response to statin therapy which was initiated time of her last visit because of a mildly abnormal CTA which did in fact document atherosclerotic plaque   Patient presents today noting that she is tolerating Crestor every third day, lipids were updated and her LDL is now nearly ideal.  Patient had previously been concerned last  October and she had  concerns over feeling poorly while at high altitude on a recent vacation.  Those symptoms were attributed to lack of adequate acclamation and at the time of last visit she was quite concerned  around her cholesterol and mildly elevated calcium score.  Patient presents otherwise with no new or active cardiac concerns or complaints,tolerating low-dose Crestor without issue whatsoever.  She remains active swimming nearly a mile several days per week and has no active cardiovascular symptoms.

## 2024-09-25 NOTE — REVIEW OF SYSTEMS
[Dyspnea on exertion] : not dyspnea during exertion [SOB] : no shortness of breath [Chest Discomfort] : no chest discomfort [Lower Ext Edema] : no extremity edema [Leg Claudication] : no intermittent leg claudication [Palpitations] : no palpitations [Orthopnea] : no orthopnea [PND] : no PND [Syncope] : no syncope [Dysuria] : no dysuria [Pelvic Pain] : no pelvic pain [Abn Vaginal Bleeding] : no unexplained vaginal bleeding [Negative] : Neurological

## 2024-09-25 NOTE — DISCUSSION/SUMMARY
[FreeTextEntry1] : Patient is a very pleasant 58-year-old female with a strong family history of CAD, known mitral valve prolapse, and recent symptoms at high altitude of dyspnea and tachycardia.  She self administered additional low-dose beta-blocker and after few days of acclamation her symptoms resolved.  She sought an opinion locally and was advised based on current lipid values to have a CT angiogram.  Patient's lipid profile notable for a total cholesterol of 242 ;HDL risk of 3.4 and HDL of 72.  Her 10-year calculated risk of a cardiac event is 1.2%.  Much to her surprise her CT angiography came back with minimal evidence of atherosclerosis and we therefore perceived her lipids differently given the documentation of CAD.  Patient was recommended to initiate statin therapy as Crestor 5 mg 3 times a week she is tolerating it well and her lipids have dropped significantly where is now her LDL is near ideal at 109.  At this point in time patient was reassured, no restrictions were placed on her activities, she feels well is tolerating her beta-blockers and Crestor well, she was recommended co-Q10 and otherwise to return to the office in 4 months time for interval follow-up.  Joel Goldberg, MD, FACC    [EKG obtained to assist in diagnosis and management of assessed problem(s)] : EKG obtained to assist in diagnosis and management of assessed problem(s)

## 2024-09-25 NOTE — PHYSICAL EXAM
[Well Developed] : well developed [Well Nourished] : well nourished [No Acute Distress] : no acute distress [Normal Venous Pressure] : normal venous pressure [No Carotid Bruit] : no carotid bruit [Normal S1, S2] : normal S1, S2 [No Rub] : no rub [Murmur] : murmur [Clear Lung Fields] : clear lung fields [Good Air Entry] : good air entry [No Respiratory Distress] : no respiratory distress  [Soft] : abdomen soft [Non Tender] : non-tender [Normal] : no edema, no cyanosis, no clubbing, no varicosities [de-identified] : Midsystolic click and grade 2/6 apical MR murmur

## 2024-09-27 RX ORDER — ZOLPIDEM TARTRATE 5 MG/1
5 TABLET ORAL
Qty: 30 | Refills: 0 | Status: ACTIVE | COMMUNITY
Start: 2024-09-27 | End: 1900-01-01

## 2024-12-10 ENCOUNTER — OFFICE (OUTPATIENT)
Dept: URBAN - METROPOLITAN AREA CLINIC 29 | Facility: CLINIC | Age: 59
Setting detail: OPHTHALMOLOGY
End: 2024-12-10
Payer: COMMERCIAL

## 2024-12-10 DIAGNOSIS — H01.004: ICD-10-CM

## 2024-12-10 DIAGNOSIS — H01.001: ICD-10-CM

## 2024-12-10 DIAGNOSIS — H52.4: ICD-10-CM

## 2024-12-10 DIAGNOSIS — H16.223: ICD-10-CM

## 2024-12-10 DIAGNOSIS — H00.15: ICD-10-CM

## 2024-12-10 PROCEDURE — 92014 COMPRE OPH EXAM EST PT 1/>: CPT | Performed by: OPHTHALMOLOGY

## 2024-12-10 ASSESSMENT — REFRACTION_MANIFEST
OS_VA1: 20/20-2
OD_VA1: 20/20-1
OD_ADD: +2.00
OD_CYLINDER: +0.50
OS_CYLINDER: +0.50
OD_SPHERE: -2.00
OD_AXIS: 5
OS_AXIS: 100
OS_SPHERE: -3.25
OS_ADD: +2.00

## 2024-12-10 ASSESSMENT — REFRACTION_CURRENTRX
OD_SPHERE: -2.00
OS_VPRISM_DIRECTION: SV
OD_AXIS: 090
OD_CYLINDER: SPH
OS_CYLINDER: SPH
OD_OVR_VA: 20/
OD_VPRISM_DIRECTION: SV
OD_OVR_VA: 20/
OS_OVR_VA: 20/
OD_AXIS: 090
OD_CYLINDER: SPH
OS_OVR_VA: 20/
OS_AXIS: 090
OS_OVR_VA: 20/
OD_CYLINDER: SPH
OS_AXIS: 090
OD_SPHERE: +1.00
OS_SPHERE: -3.00
OD_OVR_VA: 20/
OS_CYLINDER: SPH
OS_CYLINDER: SPH
OD_SPHERE: -2.00
OS_SPHERE: -2.75
OS_SPHERE: +1.00

## 2024-12-10 ASSESSMENT — CONFRONTATIONAL VISUAL FIELD TEST (CVF)
OD_FINDINGS: FULL
OS_FINDINGS: FULL

## 2024-12-10 ASSESSMENT — LID EXAM ASSESSMENTS
OS_BLEPHARITIS: LUL 1+
OS_EDEMA: LUL 1+
OD_BLEPHARITIS: RUL 1+

## 2024-12-10 ASSESSMENT — REFRACTION_AUTOREFRACTION
OS_AXIS: 100
OD_SPHERE: -1.50
OD_AXIS: 010
OS_SPHERE: -3.25
OD_CYLINDER: +0.50
OS_CYLINDER: +0.50

## 2024-12-10 ASSESSMENT — VISUAL ACUITY
OD_BCVA: 20/20-2
OS_BCVA: 20/20-2

## 2024-12-10 ASSESSMENT — SUPERFICIAL PUNCTATE KERATITIS (SPK)
OD_SPK: T
OS_SPK: T

## 2024-12-16 ENCOUNTER — OFFICE (OUTPATIENT)
Dept: URBAN - METROPOLITAN AREA CLINIC 29 | Facility: CLINIC | Age: 59
Setting detail: OPHTHALMOLOGY
End: 2024-12-16
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
## 2024-12-16 PROCEDURE — 10004 FNA BX W/O IMG GDN EA ADDL: CPT | Performed by: OPHTHALMOLOGY

## 2024-12-31 ENCOUNTER — RX RENEWAL (OUTPATIENT)
Age: 59
End: 2024-12-31

## 2025-01-24 ENCOUNTER — APPOINTMENT (OUTPATIENT)
Dept: PULMONOLOGY | Facility: CLINIC | Age: 60
End: 2025-01-24
Payer: COMMERCIAL

## 2025-01-24 ENCOUNTER — NON-APPOINTMENT (OUTPATIENT)
Age: 60
End: 2025-01-24

## 2025-01-24 VITALS
HEIGHT: 64 IN | DIASTOLIC BLOOD PRESSURE: 72 MMHG | BODY MASS INDEX: 18.78 KG/M2 | OXYGEN SATURATION: 97 % | WEIGHT: 110 LBS | RESPIRATION RATE: 16 BRPM | HEART RATE: 78 BPM | SYSTOLIC BLOOD PRESSURE: 120 MMHG | TEMPERATURE: 97.3 F

## 2025-01-24 DIAGNOSIS — R91.8 OTHER NONSPECIFIC ABNORMAL FINDING OF LUNG FIELD: ICD-10-CM

## 2025-01-24 DIAGNOSIS — G47.33 OBSTRUCTIVE SLEEP APNEA (ADULT) (PEDIATRIC): ICD-10-CM

## 2025-01-24 DIAGNOSIS — R06.02 SHORTNESS OF BREATH: ICD-10-CM

## 2025-01-24 DIAGNOSIS — R79.89 OTHER SPECIFIED ABNORMAL FINDINGS OF BLOOD CHEMISTRY: ICD-10-CM

## 2025-01-24 DIAGNOSIS — D72.10 EOSINOPHILIA, UNSPECIFIED: ICD-10-CM

## 2025-01-24 DIAGNOSIS — J30.2 OTHER SEASONAL ALLERGIC RHINITIS: ICD-10-CM

## 2025-01-24 DIAGNOSIS — Z72.820 SLEEP DEPRIVATION: ICD-10-CM

## 2025-01-24 PROCEDURE — 94727 GAS DIL/WSHOT DETER LNG VOL: CPT

## 2025-01-24 PROCEDURE — 94729 DIFFUSING CAPACITY: CPT

## 2025-01-24 PROCEDURE — 94010 BREATHING CAPACITY TEST: CPT

## 2025-01-24 PROCEDURE — 99214 OFFICE O/P EST MOD 30 MIN: CPT | Mod: 25

## 2025-01-24 PROCEDURE — ZZZZZ: CPT

## 2025-01-24 PROCEDURE — 95012 NITRIC OXIDE EXP GAS DETER: CPT

## 2025-03-11 ENCOUNTER — NON-APPOINTMENT (OUTPATIENT)
Age: 60
End: 2025-03-11

## 2025-03-18 ENCOUNTER — OFFICE (OUTPATIENT)
Dept: URBAN - METROPOLITAN AREA CLINIC 29 | Facility: CLINIC | Age: 60
Setting detail: OPHTHALMOLOGY
End: 2025-03-18
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
## 2025-03-18 PROCEDURE — 10004 FNA BX W/O IMG GDN EA ADDL: CPT | Performed by: OPHTHALMOLOGY

## 2025-04-04 ENCOUNTER — APPOINTMENT (OUTPATIENT)
Dept: CARDIOLOGY | Facility: CLINIC | Age: 60
End: 2025-04-04
Payer: COMMERCIAL

## 2025-04-04 VITALS
SYSTOLIC BLOOD PRESSURE: 100 MMHG | HEART RATE: 71 BPM | DIASTOLIC BLOOD PRESSURE: 72 MMHG | BODY MASS INDEX: 18.54 KG/M2 | OXYGEN SATURATION: 100 % | WEIGHT: 108 LBS

## 2025-04-04 DIAGNOSIS — I34.1 NONRHEUMATIC MITRAL (VALVE) PROLAPSE: ICD-10-CM

## 2025-04-04 DIAGNOSIS — E78.49 OTHER HYPERLIPIDEMIA: ICD-10-CM

## 2025-04-04 DIAGNOSIS — R07.89 OTHER CHEST PAIN: ICD-10-CM

## 2025-04-04 PROCEDURE — 93000 ELECTROCARDIOGRAM COMPLETE: CPT

## 2025-04-04 PROCEDURE — 99214 OFFICE O/P EST MOD 30 MIN: CPT

## 2025-04-04 PROCEDURE — 93306 TTE W/DOPPLER COMPLETE: CPT

## 2025-05-08 ENCOUNTER — RX RENEWAL (OUTPATIENT)
Age: 60
End: 2025-05-08

## 2025-05-20 ENCOUNTER — OUTPATIENT (OUTPATIENT)
Dept: OUTPATIENT SERVICES | Facility: HOSPITAL | Age: 60
LOS: 1 days | End: 2025-05-20
Payer: COMMERCIAL

## 2025-05-20 ENCOUNTER — APPOINTMENT (OUTPATIENT)
Dept: CT IMAGING | Facility: CLINIC | Age: 60
End: 2025-05-20

## 2025-05-20 DIAGNOSIS — R91.8 OTHER NONSPECIFIC ABNORMAL FINDING OF LUNG FIELD: ICD-10-CM

## 2025-05-20 DIAGNOSIS — Z00.8 ENCOUNTER FOR OTHER GENERAL EXAMINATION: ICD-10-CM

## 2025-05-20 PROCEDURE — 71250 CT THORAX DX C-: CPT

## 2025-05-20 PROCEDURE — 71250 CT THORAX DX C-: CPT | Mod: 26

## 2025-06-23 NOTE — ASU PATIENT PROFILE, ADULT - ABLE TO REACH PT
Arrival time and instruction left on voicemail; instructed to arrive at 06:30am; nothing to eat or drink after midnight; patient reminded to come with photo ID/insurance/credit card; dress in comfortable clothes; no jewelries/contact lens/valuable; no smoking/alcohol drinking/recreational drug use tonight; escort to have photo ID; address and callback number was given./no MD's office was made aware, As per the office, patient was given time and instruction/no

## 2025-06-23 NOTE — ASU PATIENT PROFILE, ADULT - NSICDXPASTMEDICALHX_GEN_ALL_CORE_FT
PAST MEDICAL HISTORY:  Acute UTI     DNS (deviated nasal septum)     H/O sinusitis     Hypercholesterolemia     MVP (mitral valve prolapse)     Nasal turbinate hypertrophy

## 2025-06-24 ENCOUNTER — OUTPATIENT (OUTPATIENT)
Dept: OUTPATIENT SERVICES | Facility: HOSPITAL | Age: 60
LOS: 1 days | Discharge: HOME | End: 2025-06-24
Payer: COMMERCIAL

## 2025-06-24 ENCOUNTER — TRANSCRIPTION ENCOUNTER (OUTPATIENT)
Age: 60
End: 2025-06-24

## 2025-06-24 VITALS
HEART RATE: 69 BPM | RESPIRATION RATE: 15 BRPM | DIASTOLIC BLOOD PRESSURE: 58 MMHG | SYSTOLIC BLOOD PRESSURE: 121 MMHG | TEMPERATURE: 97 F | OXYGEN SATURATION: 97 %

## 2025-06-24 VITALS
RESPIRATION RATE: 16 BRPM | HEIGHT: 64 IN | HEART RATE: 69 BPM | OXYGEN SATURATION: 99 % | TEMPERATURE: 98 F | WEIGHT: 108.03 LBS | SYSTOLIC BLOOD PRESSURE: 122 MMHG | DIASTOLIC BLOOD PRESSURE: 677 MMHG

## 2025-06-24 DIAGNOSIS — Z98.891 HISTORY OF UTERINE SCAR FROM PREVIOUS SURGERY: Chronic | ICD-10-CM

## 2025-06-24 PROCEDURE — 88300 SURGICAL PATH GROSS: CPT | Mod: 26

## 2025-06-24 RX ORDER — ACETAMINOPHEN 500 MG/5ML
1000 LIQUID (ML) ORAL ONCE
Refills: 0 | Status: COMPLETED | OUTPATIENT
Start: 2025-06-24 | End: 2025-06-24

## 2025-06-24 RX ORDER — SULFAMETHOXAZOLE
0 POWDER (GRAM) MISCELLANEOUS
Refills: 0 | DISCHARGE

## 2025-06-24 RX ORDER — APREPITANT 40 MG/1
40 CAPSULE ORAL ONCE
Refills: 0 | Status: COMPLETED | OUTPATIENT
Start: 2025-06-24 | End: 2025-06-24

## 2025-06-24 RX ORDER — FENTANYL CITRATE-0.9 % NACL/PF 100MCG/2ML
25 SYRINGE (ML) INTRAVENOUS
Refills: 0 | Status: DISCONTINUED | OUTPATIENT
Start: 2025-06-24 | End: 2025-06-24

## 2025-06-24 RX ORDER — PROPRANOLOL HCL 60 MG
1 TABLET ORAL
Refills: 0 | DISCHARGE

## 2025-06-24 RX ORDER — ROSUVASTATIN CALCIUM 20 MG/1
1 TABLET, FILM COATED ORAL
Refills: 0 | DISCHARGE

## 2025-06-24 RX ORDER — SODIUM CHLORIDE 9 G/1000ML
500 INJECTION, SOLUTION INTRAVENOUS
Refills: 0 | Status: DISCONTINUED | OUTPATIENT
Start: 2025-06-24 | End: 2025-06-24

## 2025-06-24 RX ORDER — ONDANSETRON HCL/PF 4 MG/2 ML
4 VIAL (ML) INJECTION ONCE
Refills: 0 | Status: DISCONTINUED | OUTPATIENT
Start: 2025-06-24 | End: 2025-06-24

## 2025-06-24 RX ADMIN — APREPITANT 40 MILLIGRAM(S): 40 CAPSULE ORAL at 07:18

## 2025-06-24 RX ADMIN — Medication 25 MICROGRAM(S): at 11:47

## 2025-06-24 RX ADMIN — Medication 25 MICROGRAM(S): at 12:02

## 2025-06-24 RX ADMIN — Medication 25 MICROGRAM(S): at 11:18

## 2025-06-24 RX ADMIN — Medication 25 MICROGRAM(S): at 11:29

## 2025-06-24 RX ADMIN — Medication 25 MICROGRAM(S): at 11:33

## 2025-06-24 RX ADMIN — Medication 1000 MILLIGRAM(S): at 07:18

## 2025-06-24 RX ADMIN — Medication 25 MICROGRAM(S): at 11:44

## 2025-06-24 NOTE — ASU DISCHARGE PLAN (ADULT/PEDIATRIC) - FINANCIAL ASSISTANCE
[facility] provides services at a reduced cost to those who are determined to be eligible through [facility]’s financial assistance program. Information regarding [facility]’s financial assistance program can be found by going to [email or web address] or by calling [phone].

## 2025-06-24 NOTE — BRIEF OPERATIVE NOTE - NSICDXBRIEFPROCEDURE_GEN_ALL_CORE_FT
PROCEDURES:  Rhinoplasty with nasal septoplasty and submucous resection 24-Jun-2025 11:23:45  Christopher Alfaro  Septoplasty, nose, endoscopic 24-Jun-2025 11:24:09  Christopher Alfaro

## 2025-07-01 ENCOUNTER — OFFICE (OUTPATIENT)
Dept: URBAN - METROPOLITAN AREA CLINIC 29 | Facility: CLINIC | Age: 60
Setting detail: OPHTHALMOLOGY
End: 2025-07-01
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
## 2025-07-01 PROCEDURE — 10004 FNA BX W/O IMG GDN EA ADDL: CPT | Performed by: OPHTHALMOLOGY

## 2025-07-08 PROBLEM — Z87.09 PERSONAL HISTORY OF OTHER DISEASES OF THE RESPIRATORY SYSTEM: Chronic | Status: ACTIVE | Noted: 2025-06-24

## 2025-07-08 PROBLEM — E78.00 PURE HYPERCHOLESTEROLEMIA, UNSPECIFIED: Chronic | Status: ACTIVE | Noted: 2025-06-24

## 2025-07-08 PROBLEM — I34.1 NONRHEUMATIC MITRAL (VALVE) PROLAPSE: Chronic | Status: ACTIVE | Noted: 2025-06-24

## 2025-07-08 PROBLEM — J34.3 HYPERTROPHY OF NASAL TURBINATES: Chronic | Status: ACTIVE | Noted: 2025-06-24

## 2025-07-08 PROBLEM — N39.0 URINARY TRACT INFECTION, SITE NOT SPECIFIED: Chronic | Status: ACTIVE | Noted: 2025-06-24

## 2025-08-22 ENCOUNTER — NON-APPOINTMENT (OUTPATIENT)
Age: 60
End: 2025-08-22

## (undated) DEVICE — BLADE MEDTRONIC ENT FUSION QUADCUT ROTATABLE STRAIGHT 4.3MM X 13CM

## (undated) DEVICE — BLADE MEDTRONIC ENT INFERIOR TURBINATE ROTATABLE STRAIGHT 2MM X11CM

## (undated) DEVICE — MARKING PEN W RULER

## (undated) DEVICE — GLV 7.5 PROTEXIS (WHITE)

## (undated) DEVICE — Device

## (undated) DEVICE — DRSG AQUAPLAST BLANK BLUSH 3 X 3"

## (undated) DEVICE — APPLICATOR COTTON TIP 6"

## (undated) DEVICE — DRSG TELFA 3 X 8

## (undated) DEVICE — MEDTRONIC INSTRUMENT TRACKER ENT

## (undated) DEVICE — PACK RHINOPLASTY

## (undated) DEVICE — BLADE MEDTRONIC ENT QUADCUT ROTATABLE STRAIGHT 4MM X 13CM

## (undated) DEVICE — SUT PROLENE 3-0 18" PS-2

## (undated) DEVICE — SOL ANTI FOG (FRED)

## (undated) DEVICE — VENODYNE/SCD SLEEVE CALF MEDIUM

## (undated) DEVICE — PETRI DISH MED 3.5"

## (undated) DEVICE — DRSG 2 X 2" STERILE

## (undated) DEVICE — ELCTR BOVIE PENCIL BLADE 10FT

## (undated) DEVICE — PREP BETADINE KIT

## (undated) DEVICE — DRAPE MAYO STAND 23"

## (undated) DEVICE — SUT PLAIN GUT 4-0 18" SC-1

## (undated) DEVICE — TUBING SUCTION NONCONDUCTIVE 6MM X 12FT

## (undated) DEVICE — DRSG TEGADERM 2.5 X 3"

## (undated) DEVICE — SUT CHROMIC 4-0 18" G-3

## (undated) DEVICE — MEDTRONIC AXIEM PATIENT TRACKER NON-INVASIVE